# Patient Record
Sex: MALE | Race: WHITE | NOT HISPANIC OR LATINO | Employment: OTHER | URBAN - METROPOLITAN AREA
[De-identification: names, ages, dates, MRNs, and addresses within clinical notes are randomized per-mention and may not be internally consistent; named-entity substitution may affect disease eponyms.]

---

## 2018-06-18 DIAGNOSIS — Z12.11 COLON CANCER SCREENING: ICD-10-CM

## 2018-06-29 ENCOUNTER — OFFICE VISIT (OUTPATIENT)
Dept: FAMILY MEDICINE | Facility: CLINIC | Age: 68
End: 2018-06-29
Payer: MEDICARE

## 2018-06-29 VITALS
HEART RATE: 80 BPM | BODY MASS INDEX: 34.81 KG/M2 | SYSTOLIC BLOOD PRESSURE: 110 MMHG | RESPIRATION RATE: 20 BRPM | HEIGHT: 72 IN | WEIGHT: 257 LBS | DIASTOLIC BLOOD PRESSURE: 80 MMHG

## 2018-06-29 DIAGNOSIS — Z12.11 SCREEN FOR COLON CANCER: ICD-10-CM

## 2018-06-29 DIAGNOSIS — E78.5 HYPERLIPIDEMIA, UNSPECIFIED HYPERLIPIDEMIA TYPE: ICD-10-CM

## 2018-06-29 DIAGNOSIS — R97.20 ELEVATED PSA: Primary | ICD-10-CM

## 2018-06-29 DIAGNOSIS — Z12.5 SCREENING FOR PROSTATE CANCER: Primary | ICD-10-CM

## 2018-06-29 DIAGNOSIS — Z28.39 IMMUNIZATION DEFICIENCY: ICD-10-CM

## 2018-06-29 DIAGNOSIS — E55.9 VITAMIN D INSUFFICIENCY: ICD-10-CM

## 2018-06-29 DIAGNOSIS — N47.1 PHIMOSIS: ICD-10-CM

## 2018-06-29 LAB
ALBUMIN SERPL BCP-MCNC: 3.6 G/DL
ALP SERPL-CCNC: 95 U/L
ALT SERPL W/O P-5'-P-CCNC: 13 U/L
ANION GAP SERPL CALC-SCNC: 7 MMOL/L
AST SERPL-CCNC: 16 U/L
BASOPHILS # BLD AUTO: 0.06 K/UL
BASOPHILS NFR BLD: 1.1 %
BILIRUB SERPL-MCNC: 0.5 MG/DL
BUN SERPL-MCNC: 15 MG/DL
CALCIUM SERPL-MCNC: 9.1 MG/DL
CHLORIDE SERPL-SCNC: 107 MMOL/L
CHOLEST SERPL-MCNC: 182 MG/DL
CHOLEST/HDLC SERPL: 5.2 {RATIO}
CO2 SERPL-SCNC: 24 MMOL/L
COMPLEXED PSA SERPL-MCNC: 6.6 NG/ML
CREAT SERPL-MCNC: 1 MG/DL
DIFFERENTIAL METHOD: ABNORMAL
EOSINOPHIL # BLD AUTO: 0.2 K/UL
EOSINOPHIL NFR BLD: 2.9 %
ERYTHROCYTE [DISTWIDTH] IN BLOOD BY AUTOMATED COUNT: 17.2 %
EST. GFR  (AFRICAN AMERICAN): >60 ML/MIN/1.73 M^2
EST. GFR  (NON AFRICAN AMERICAN): >60 ML/MIN/1.73 M^2
GLUCOSE SERPL-MCNC: 95 MG/DL
HCT VFR BLD AUTO: 38.7 %
HDLC SERPL-MCNC: 35 MG/DL
HDLC SERPL: 19.2 %
HGB BLD-MCNC: 11.5 G/DL
LDLC SERPL CALC-MCNC: 97.4 MG/DL
LYMPHOCYTES # BLD AUTO: 1.3 K/UL
LYMPHOCYTES NFR BLD: 22.9 %
MCH RBC QN AUTO: 22.7 PG
MCHC RBC AUTO-ENTMCNC: 29.7 G/DL
MCV RBC AUTO: 76 FL
MONOCYTES # BLD AUTO: 0.5 K/UL
MONOCYTES NFR BLD: 8.7 %
NEUTROPHILS # BLD AUTO: 3.6 K/UL
NEUTROPHILS NFR BLD: 64.4 %
NONHDLC SERPL-MCNC: 147 MG/DL
PLATELET # BLD AUTO: 352 K/UL
PMV BLD AUTO: 10.3 FL
POTASSIUM SERPL-SCNC: 4.4 MMOL/L
PROT SERPL-MCNC: 7 G/DL
RBC # BLD AUTO: 5.07 M/UL
SODIUM SERPL-SCNC: 138 MMOL/L
TRIGL SERPL-MCNC: 248 MG/DL
TSH SERPL DL<=0.005 MIU/L-ACNC: 2.38 UIU/ML
WBC # BLD AUTO: 5.54 K/UL

## 2018-06-29 PROCEDURE — 80061 LIPID PANEL: CPT

## 2018-06-29 PROCEDURE — 99999 PR PBB SHADOW E&M-EST. PATIENT-LVL IV: CPT | Mod: PBBFAC,,, | Performed by: FAMILY MEDICINE

## 2018-06-29 PROCEDURE — 99214 OFFICE O/P EST MOD 30 MIN: CPT | Mod: PBBFAC | Performed by: FAMILY MEDICINE

## 2018-06-29 PROCEDURE — 36415 COLL VENOUS BLD VENIPUNCTURE: CPT | Mod: PBBFAC

## 2018-06-29 PROCEDURE — 82306 VITAMIN D 25 HYDROXY: CPT

## 2018-06-29 PROCEDURE — 80053 COMPREHEN METABOLIC PANEL: CPT

## 2018-06-29 PROCEDURE — 99999 PR STA SHADOW: CPT | Mod: PBBFAC,,, | Performed by: FAMILY MEDICINE

## 2018-06-29 PROCEDURE — 84153 ASSAY OF PSA TOTAL: CPT

## 2018-06-29 PROCEDURE — 99999 PNEUMOCOCCAL POLYSACCHARIDE VACCINE 23-VALENT =>2YO SQ IM: CPT | Mod: PBBFAC,,,

## 2018-06-29 PROCEDURE — 84443 ASSAY THYROID STIM HORMONE: CPT

## 2018-06-29 PROCEDURE — G0009 ADMIN PNEUMOCOCCAL VACCINE: HCPCS | Mod: PBBFAC

## 2018-06-29 PROCEDURE — 99215 OFFICE O/P EST HI 40 MIN: CPT | Mod: S$PBB | Performed by: FAMILY MEDICINE

## 2018-06-29 PROCEDURE — 85025 COMPLETE CBC W/AUTO DIFF WBC: CPT

## 2018-06-29 RX ORDER — CLOTRIMAZOLE AND BETAMETHASONE DIPROPIONATE 10; .64 MG/G; MG/G
CREAM TOPICAL
Qty: 45 G | Refills: 3 | Status: SHIPPED | OUTPATIENT
Start: 2018-06-29 | End: 2018-11-07

## 2018-06-29 NOTE — PROGRESS NOTES
The following lab results were abnormal. The following recommendations were made:PSA is too hi, so I want you to be seen by a urologist for this issue; also a low fat diet A& yoou are mildly anemic, so a Colonoscopy is a good place to start

## 2018-06-29 NOTE — PROGRESS NOTES
Subjective:       Patient ID: Awilda Rosenthal is a 67 y.o. male.    Chief Complaint: Follow-up (annual f/u)    Pt is a 67 y.o. male who presents for check up for phimosis. Doing well on current meds. Denies any side effects. Prevention is up to date.      Review of Systems   Constitutional: Negative for appetite change.   HENT: Negative for congestion, ear pain, sneezing and sore throat.    Eyes: Negative for redness and visual disturbance.   Respiratory: Negative for cough, chest tightness and stridor.    Cardiovascular: Negative for chest pain.   Gastrointestinal: Negative for abdominal pain, blood in stool, diarrhea, nausea and vomiting.   Genitourinary: Negative for difficulty urinating, dysuria and hematuria.        Foreskin is irritated   Musculoskeletal: Negative for arthralgias, back pain, joint swelling, myalgias and neck pain.   Skin: Negative for rash.   Neurological: Negative for dizziness.   Psychiatric/Behavioral: Negative for agitation. The patient is not nervous/anxious.        Objective:      Physical Exam   Constitutional: He is oriented to person, place, and time. He appears well-developed and well-nourished.   HENT:   Head: Normocephalic.   Eyes: Pupils are equal, round, and reactive to light.   Neck: Normal range of motion. Neck supple. No thyromegaly present.   Cardiovascular: Normal rate and regular rhythm.  Exam reveals no friction rub.    No murmur heard.  Pulmonary/Chest: Effort normal. No respiratory distress. He has no wheezes.   Abdominal: There is no tenderness. There is no rebound and no guarding.   Genitourinary:   Genitourinary Comments: 25 gm smooth; glans is dry, red and cracked   Musculoskeletal: Normal range of motion. He exhibits no edema or tenderness.   Lymphadenopathy:     He has no cervical adenopathy.   Neurological: He is alert and oriented to person, place, and time. He has normal reflexes. No cranial nerve deficit.   Skin: Skin is warm and dry.   Psychiatric: He  has a normal mood and affect. Judgment and thought content normal.       Assessment:       1. Screening for prostate cancer    2. Vitamin D insufficiency    3. Hyperlipidemia, unspecified hyperlipidemia type    4. Immunization deficiency    5. Phimosis    6. Screen for colon cancer        Plan:   Awilda was seen today for follow-up.    Diagnoses and all orders for this visit:    Screening for prostate cancer  -     PSA, Screening; Future    Vitamin D insufficiency  -     Vitamin D; Future    Hyperlipidemia, unspecified hyperlipidemia type  -     CBC auto differential; Future  -     Comprehensive metabolic panel; Future  -     TSH; Future  -     Lipid panel; Future    Immunization deficiency  -     Pneumococcal Polysaccharide Vaccine (23 Valent) (SQ/IM)    Phimosis    Screen for colon cancer  -     Ambulatory referral to Colorectal Surgery

## 2018-06-30 LAB — 25(OH)D3+25(OH)D2 SERPL-MCNC: 20 NG/ML

## 2018-07-02 ENCOUNTER — TELEPHONE (OUTPATIENT)
Dept: FAMILY MEDICINE | Facility: CLINIC | Age: 68
End: 2018-07-02

## 2018-07-02 NOTE — TELEPHONE ENCOUNTER
----- Message from Dustin Pete MD sent at 6/29/2018  4:47 PM CDT -----  The following lab results were abnormal. The following recommendations were made:PSA is too hi, so I want you to be seen by a urologist for this issue; also a low fat diet A& yoou are mildly anemic, so a Colonoscopy is a good place to start/also vit d otc 3,000 units daily

## 2018-07-03 ENCOUNTER — TELEPHONE (OUTPATIENT)
Dept: FAMILY MEDICINE | Facility: CLINIC | Age: 68
End: 2018-07-03

## 2018-07-03 NOTE — TELEPHONE ENCOUNTER
Our attempts to reach you by telephone have been unsuccessful. Please call our office at  [phone] to review your most recent lab and/or test results.

## 2018-07-27 ENCOUNTER — OFFICE VISIT (OUTPATIENT)
Dept: FAMILY MEDICINE | Facility: CLINIC | Age: 68
End: 2018-07-27
Payer: MEDICARE

## 2018-07-27 VITALS
DIASTOLIC BLOOD PRESSURE: 80 MMHG | SYSTOLIC BLOOD PRESSURE: 118 MMHG | WEIGHT: 257 LBS | RESPIRATION RATE: 20 BRPM | BODY MASS INDEX: 34.81 KG/M2 | HEART RATE: 80 BPM | HEIGHT: 72 IN

## 2018-07-27 DIAGNOSIS — R97.20 ELEVATED PSA: ICD-10-CM

## 2018-07-27 DIAGNOSIS — E78.5 HYPERLIPIDEMIA, UNSPECIFIED HYPERLIPIDEMIA TYPE: Primary | ICD-10-CM

## 2018-07-27 PROCEDURE — 99213 OFFICE O/P EST LOW 20 MIN: CPT | Mod: PBBFAC | Performed by: FAMILY MEDICINE

## 2018-07-27 PROCEDURE — 99999 PR STA SHADOW: CPT | Mod: PBBFAC,,, | Performed by: FAMILY MEDICINE

## 2018-07-27 PROCEDURE — 99213 OFFICE O/P EST LOW 20 MIN: CPT | Mod: S$PBB | Performed by: FAMILY MEDICINE

## 2018-07-27 PROCEDURE — 99999 PR PBB SHADOW E&M-EST. PATIENT-LVL III: CPT | Mod: PBBFAC,,, | Performed by: FAMILY MEDICINE

## 2018-07-27 RX ORDER — CHOLECALCIFEROL (VITAMIN D3) 25 MCG
2000 TABLET ORAL DAILY
COMMUNITY
End: 2020-10-14 | Stop reason: CLARIF

## 2018-07-27 NOTE — PROGRESS NOTES
Subjective:       Patient ID: Awilda Rosenthal is a 67 y.o. male.    Chief Complaint: Follow-up (1 mo)    Pt is a 67 y.o. male who presents for check up for elevated PSA . Doing well on current meds. Denies any side effects. Prevention is up to date.      Review of Systems   Constitutional: Negative for appetite change.   HENT: Negative for congestion, ear pain, sneezing and sore throat.    Eyes: Negative for redness and visual disturbance.   Respiratory: Negative for cough, chest tightness and stridor.    Cardiovascular: Negative for chest pain.   Gastrointestinal: Negative for abdominal pain, blood in stool, diarrhea, nausea and vomiting.   Genitourinary: Negative for difficulty urinating, dysuria and hematuria.         Nocturia x 2-3   Musculoskeletal: Negative for arthralgias, back pain, joint swelling, myalgias and neck pain.   Skin: Negative for rash.   Neurological: Negative for dizziness.        Sleeping 5-6 hrs   Psychiatric/Behavioral: Negative for agitation. The patient is not nervous/anxious.        Objective:      Physical Exam   Constitutional: He is oriented to person, place, and time. He appears well-developed.   BM 34   HENT:   Head: Normocephalic.   Eyes: Pupils are equal, round, and reactive to light.   Neck: Normal range of motion. Neck supple. No thyromegaly present.   Cardiovascular: Normal rate and regular rhythm.  Exam reveals no friction rub.    No murmur heard.  Pulmonary/Chest: Effort normal. No respiratory distress. He has no wheezes.   Abdominal: There is no tenderness. There is no rebound and no guarding.   Genitourinary:   Genitourinary Comments: Foreskin  Is healed   Musculoskeletal: Normal range of motion. He exhibits no edema or tenderness.   Lymphadenopathy:     He has no cervical adenopathy.   Neurological: He is alert and oriented to person, place, and time. He has normal reflexes. No cranial nerve deficit.   Skin: Skin is warm and dry.   Psychiatric: He has a normal mood  and affect. Judgment and thought content normal.       Assessment:       1. Hyperlipidemia, unspecified hyperlipidemia type    2. Elevated PSA        Plan:   Awilda was seen today for follow-up.    Diagnoses and all orders for this visit:    Hyperlipidemia, unspecified hyperlipidemia type    Elevated PSA

## 2018-08-10 DIAGNOSIS — Z11.59 NEED FOR HEPATITIS C SCREENING TEST: ICD-10-CM

## 2018-08-16 ENCOUNTER — OFFICE VISIT (OUTPATIENT)
Dept: SURGERY | Facility: CLINIC | Age: 68
End: 2018-08-16
Payer: MEDICARE

## 2018-08-16 VITALS
DIASTOLIC BLOOD PRESSURE: 74 MMHG | WEIGHT: 258.63 LBS | BODY MASS INDEX: 35.03 KG/M2 | SYSTOLIC BLOOD PRESSURE: 120 MMHG | HEIGHT: 72 IN

## 2018-08-16 DIAGNOSIS — Z12.11 COLON CANCER SCREENING: Primary | ICD-10-CM

## 2018-08-16 PROCEDURE — 99202 OFFICE O/P NEW SF 15 MIN: CPT | Mod: S$PBB,,, | Performed by: COLON & RECTAL SURGERY

## 2018-08-16 PROCEDURE — 99212 OFFICE O/P EST SF 10 MIN: CPT | Mod: PBBFAC | Performed by: COLON & RECTAL SURGERY

## 2018-08-16 PROCEDURE — 99999 PR PBB SHADOW E&M-EST. PATIENT-LVL II: CPT | Mod: PBBFAC,,, | Performed by: COLON & RECTAL SURGERY

## 2018-08-16 NOTE — LETTER
August 16, 2018      Dustin Pete MD  [address] [place] [address]           [facility]  [address] [place] [address]  Phone: [phone]  Fax: [phone]          Patient: Awilda Rosenthal   MR Number: [id number]   YOB: 1950   Date of Visit: 8/16/2018       Dear Dr. Dustin Pete:    Thank you for referring Awilda Rosenthal to me for evaluation. Attached you will find relevant portions of my assessment and plan of care.    If you have questions, please do not hesitate to call me. I look forward to following Awilda Rosenthal along with you.    Sincerely,    Rylan Razo MD    Enclosure  CC:  No Recipients    If you would like to receive this communication electronically, please contact [email or web address] or [phone] to request more information on [employer] Link access.    For providers and/or their staff who would like to refer a patient to [facility], please contact us through our one-stop-shop provider referral line, [facility], at [phone].    If you feel you have received this communication in error or would no longer like to receive these types of communications, please e-mail [email or web address]

## 2018-08-16 NOTE — PROGRESS NOTES
Subjective:       Patient ID: Awilda Rosenthal is a 67 y.o. male.    Chief Complaint: Consult    HPI   68 yo M referred for screening colonoscopy. +Recent bump in PSA.  +urinary frequency & weak stream.  Denies blood in stool.  No abdominal pain, unexplained weight loss, anorexia, recent change in bowel habits, or other constitutional symptoms. Has appt with  next week.    Last colonoscopy - 15-20 yrs ago   No known family hx of CRC or IBD.    Review of patient's allergies indicates:  No Known Allergies    History reviewed. No pertinent past medical history.    Past Surgical History:   Procedure Laterality Date    APPENDECTOMY      kidney stones         Current Outpatient Medications   Medication Sig Dispense Refill    aspirin (ECOTRIN) 81 MG EC tablet Take 81 mg by mouth once daily.      clotrimazole-betamethasone 1-0.05% (LOTRISONE) cream Apply to head of  Penis BId 45 g 3    vitamin D 1000 units Tab Take 2,000 Units by mouth once daily.       No current facility-administered medications for this visit.        Family History   Problem Relation Age of Onset    Heart disease Father        Social History     Socioeconomic History    Marital status: Single     Spouse name: None    Number of children: None    Years of education: None    Highest education level: None   Social Needs    Financial resource strain: None    Food insecurity - worry: None    Food insecurity - inability: None    Transportation needs - medical: None    Transportation needs - non-medical: None   Occupational History     Employer: OTHER   Tobacco Use    Smoking status: Never Smoker   Substance and Sexual Activity    Alcohol use: No    Drug use: No    Sexual activity: None   Other Topics Concern    None   Social History Narrative    None       Review of Systems   Constitutional: Negative for chills and fever.   HENT: Negative for congestion and sinus pressure.    Eyes: Negative for visual disturbance.   Respiratory:  Negative for cough and shortness of breath.    Cardiovascular: Negative for chest pain and palpitations.   Gastrointestinal: Negative for abdominal distention, abdominal pain, anal bleeding, blood in stool, constipation, diarrhea, nausea, rectal pain and vomiting.   Endocrine: Negative for cold intolerance and heat intolerance.   Genitourinary: Positive for frequency. Negative for dysuria.   Musculoskeletal: Negative for arthralgias and back pain.   Skin: Negative for rash.   Allergic/Immunologic: Negative for immunocompromised state.   Neurological: Negative for dizziness, light-headedness and headaches.   Psychiatric/Behavioral: Negative for confusion. The patient is not nervous/anxious.        Objective:      Physical Exam   Constitutional: He is oriented to person, place, and time. He appears well-developed and well-nourished.   HENT:   Head: Normocephalic and atraumatic.   Eyes: Conjunctivae are normal.   Pulmonary/Chest: Effort normal. No respiratory distress.   Abdominal: Soft. He exhibits no distension and no mass. There is no tenderness. There is no rebound and no guarding.   Neurological: He is alert and oriented to person, place, and time.   Skin: Skin is warm and dry. No erythema.         Lab Results   Component Value Date    WBC 5.54 06/29/2018    HGB 11.5 (L) 06/29/2018    HCT 38.7 (L) 06/29/2018    MCV 76 (L) 06/29/2018     (H) 06/29/2018     BMP  Lab Results   Component Value Date     06/29/2018    K 4.4 06/29/2018     06/29/2018    CO2 24 06/29/2018    BUN 15 06/29/2018    CREATININE 1.0 06/29/2018    CALCIUM 9.1 06/29/2018    ANIONGAP 7 (L) 06/29/2018    ESTGFRAFRICA >60 06/29/2018    EGFRNONAA >60 06/29/2018     CMP  Sodium   Date Value Ref Range Status   06/29/2018 138 136 - 145 mmol/L Final     Potassium   Date Value Ref Range Status   06/29/2018 4.4 3.5 - 5.1 mmol/L Final     Chloride   Date Value Ref Range Status   06/29/2018 107 95 - 110 mmol/L Final     CO2   Date Value  Ref Range Status   06/29/2018 24 23 - 29 mmol/L Final     Glucose   Date Value Ref Range Status   06/29/2018 95 70 - 110 mg/dL Final     BUN, Bld   Date Value Ref Range Status   06/29/2018 15 8 - 23 mg/dL Final     Creatinine   Date Value Ref Range Status   06/29/2018 1.0 0.5 - 1.4 mg/dL Final     Calcium   Date Value Ref Range Status   06/29/2018 9.1 8.7 - 10.5 mg/dL Final     Total Protein   Date Value Ref Range Status   06/29/2018 7.0 6.0 - 8.4 g/dL Final     Albumin   Date Value Ref Range Status   06/29/2018 3.6 3.5 - 5.2 g/dL Final     Total Bilirubin   Date Value Ref Range Status   06/29/2018 0.5 0.1 - 1.0 mg/dL Final     Comment:     For infants and newborns, interpretation of results should be based  on gestational age, weight and in agreement with clinical  observations.  Premature Infant recommended reference ranges:  Up to 24 hours.............<8.0 mg/dL  Up to 48 hours............<12.0 mg/dL  3-5 days..................<15.0 mg/dL  6-29 days.................<15.0 mg/dL       Alkaline Phosphatase   Date Value Ref Range Status   06/29/2018 95 55 - 135 U/L Final     AST   Date Value Ref Range Status   06/29/2018 16 10 - 40 U/L Final     ALT   Date Value Ref Range Status   06/29/2018 13 10 - 44 U/L Final     Anion Gap   Date Value Ref Range Status   06/29/2018 7 (L) 8 - 16 mmol/L Final     eGFR if    Date Value Ref Range Status   06/29/2018 >60 >60 mL/min/1.73 m^2 Final     eGFR if non    Date Value Ref Range Status   06/29/2018 >60 >60 mL/min/1.73 m^2 Final     Comment:     Calculation used to obtain the estimated glomerular filtration  rate (eGFR) is the CKD-EPI equation.        No results found for: CEA        Assessment:       1. Colon cancer screening        Plan:   Schedule for low risk screening colonoscopy at [facility].    Rylan Razo MD, FACS, FASCRS  Senior Staff Surgeon  Department of Colon & Rectal Surgery

## 2018-08-22 ENCOUNTER — OFFICE VISIT (OUTPATIENT)
Dept: UROLOGY | Facility: CLINIC | Age: 68
End: 2018-08-22
Attending: UROLOGY
Payer: MEDICARE

## 2018-08-22 VITALS
BODY MASS INDEX: 34.72 KG/M2 | HEART RATE: 80 BPM | WEIGHT: 256.38 LBS | RESPIRATION RATE: 20 BRPM | SYSTOLIC BLOOD PRESSURE: 134 MMHG | HEIGHT: 72 IN | DIASTOLIC BLOOD PRESSURE: 82 MMHG

## 2018-08-22 DIAGNOSIS — R97.20 ELEVATED PSA: Primary | ICD-10-CM

## 2018-08-22 PROCEDURE — 99999 PR PBB SHADOW E&M-EST. PATIENT-LVL III: CPT | Mod: PBBFAC,,, | Performed by: UROLOGY

## 2018-08-22 PROCEDURE — 99204 OFFICE O/P NEW MOD 45 MIN: CPT | Mod: S$PBB | Performed by: UROLOGY

## 2018-08-22 PROCEDURE — 99213 OFFICE O/P EST LOW 20 MIN: CPT | Mod: PBBFAC | Performed by: UROLOGY

## 2018-08-22 PROCEDURE — 99999 PR STA SHADOW: CPT | Mod: PBBFAC,,, | Performed by: UROLOGY

## 2018-08-22 RX ORDER — SULFAMETHOXAZOLE AND TRIMETHOPRIM 800; 160 MG/1; MG/1
1 TABLET ORAL 2 TIMES DAILY
Qty: 60 TABLET | Refills: 0 | Status: SHIPPED | OUTPATIENT
Start: 2018-08-22 | End: 2018-09-21

## 2018-08-22 NOTE — LETTER
August 22, 2018      Dustin Pete MD  [address]  [place] [address]           [place] [facility]  [address] [facility] [address]  Phone: [phone]          Patient: Awilda Rosenthal   MR Number: [id number]   YOB: 1950   Date of Visit: 8/22/2018       Dear Dr. Dustin Pete:    Thank you for referring Awilda Rosenthal to me for evaluation. Attached you will find relevant portions of my assessment and plan of care.    If you have questions, please do not hesitate to call me. I look forward to following Awilda Rosenthal along with you.    Sincerely,    Bashir Giles Jr., MD    Enclosure  CC:  No Recipients    If you would like to receive this communication electronically, please contact [email or web address] or [phone] to request more information on [employer] Link access.    For providers and/or their staff who would like to refer a patient to [facility], please contact us through our one-stop-shop provider referral line, [facility] , at [phone].    If you feel you have received this communication in error or would no longer like to receive these types of communications, please e-mail [email or web address]

## 2018-08-22 NOTE — PROGRESS NOTES
Subjective:       Patient ID: Awilda Rosenthal is a 67 y.o. male.    Chief Complaint: Urinary Frequency and Urinary Urgency    HPI patient is here with an elevated PSA of 6.5.  He has urgency and frequency but he has a good stream and empties his bladder well.  Urine is negative.    History reviewed. No pertinent past medical history.    Past Surgical History:   Procedure Laterality Date    APPENDECTOMY      kidney stones         Family History   Problem Relation Age of Onset    Heart disease Father        Social History     Socioeconomic History    Marital status: Single     Spouse name: Not on file    Number of children: Not on file    Years of education: Not on file    Highest education level: Not on file   Social Needs    Financial resource strain: Not on file    Food insecurity - worry: Not on file    Food insecurity - inability: Not on file    Transportation needs - medical: Not on file    Transportation needs - non-medical: Not on file   Occupational History     Employer: OTHER   Tobacco Use    Smoking status: Never Smoker   Substance and Sexual Activity    Alcohol use: No    Drug use: No    Sexual activity: Not on file   Other Topics Concern    Not on file   Social History Narrative    Not on file       Allergies:  Patient has no known allergies.    Medications:    Current Outpatient Medications:     aspirin (ECOTRIN) 81 MG EC tablet, Take 81 mg by mouth once daily., Disp: , Rfl:     clotrimazole-betamethasone 1-0.05% (LOTRISONE) cream, Apply to head of  Penis BId, Disp: 45 g, Rfl: 3    vitamin D 1000 units Tab, Take 2,000 Units by mouth once daily., Disp: , Rfl:     sulfamethoxazole-trimethoprim 800-160mg (BACTRIM DS) 800-160 mg Tab, Take 1 tablet by mouth 2 (two) times daily., Disp: 60 tablet, Rfl: 0    Review of Systems   Constitutional: Negative for activity change, appetite change, chills, diaphoresis, fatigue, fever and unexpected weight change.   HENT: Negative for congestion,  dental problem, hearing loss, mouth sores, postnasal drip, rhinorrhea, sinus pressure and trouble swallowing.    Eyes: Negative for pain, discharge and itching.   Respiratory: Negative for apnea, cough, choking, chest tightness, shortness of breath and wheezing.    Cardiovascular: Negative for chest pain, palpitations and leg swelling.   Gastrointestinal: Negative for abdominal distention, abdominal pain, anal bleeding, blood in stool, constipation, diarrhea, nausea, rectal pain and vomiting.   Endocrine: Negative for polydipsia and polyuria.   Genitourinary: Positive for urgency. Negative for decreased urine volume, difficulty urinating, discharge, dysuria, enuresis, flank pain, frequency, genital sores, hematuria, penile pain, penile swelling, scrotal swelling and testicular pain.   Musculoskeletal: Negative for arthralgias, back pain and myalgias.   Skin: Negative for color change, rash and wound.   Neurological: Negative for dizziness, syncope, speech difficulty, light-headedness and headaches.   Hematological: Negative for adenopathy. Does not bruise/bleed easily.   Psychiatric/Behavioral: Negative for behavioral problems, confusion, hallucinations and sleep disturbance.       Objective:      Physical Exam   Constitutional: He appears well-developed.   HENT:   Head: Normocephalic.   Cardiovascular: Normal rate.    Pulmonary/Chest: Effort normal.   Abdominal: Soft.   Genitourinary: Prostate normal.   Genitourinary Comments: 30 g benign no nodules   Neurological: He is alert.   Skin: Skin is warm.     Psychiatric: He has a normal mood and affect.       Assessment:       1. Elevated PSA        Plan:       Awilda was seen today for urinary frequency and urinary urgency.    Diagnoses and all orders for this visit:    Elevated PSA  -     Prostate Specific Antigen, Diagnostic; Future    Other orders  -     sulfamethoxazole-trimethoprim 800-160mg (BACTRIM DS) 800-160 mg Tab; Take 1 tablet by mouth 2 (two) times  daily.        return to clinic 6 weeks with PSA

## 2018-09-28 ENCOUNTER — LAB VISIT (OUTPATIENT)
Dept: LAB | Facility: HOSPITAL | Age: 68
End: 2018-09-28
Attending: UROLOGY
Payer: MEDICARE

## 2018-09-28 DIAGNOSIS — R97.20 ELEVATED PSA: ICD-10-CM

## 2018-09-28 LAB — COMPLEXED PSA SERPL-MCNC: 5.7 NG/ML

## 2018-09-28 PROCEDURE — 84153 ASSAY OF PSA TOTAL: CPT

## 2018-09-28 PROCEDURE — 36415 COLL VENOUS BLD VENIPUNCTURE: CPT

## 2018-10-03 ENCOUNTER — OFFICE VISIT (OUTPATIENT)
Dept: UROLOGY | Facility: CLINIC | Age: 68
End: 2018-10-03
Attending: UROLOGY
Payer: MEDICARE

## 2018-10-03 VITALS
DIASTOLIC BLOOD PRESSURE: 82 MMHG | RESPIRATION RATE: 18 BRPM | WEIGHT: 258.63 LBS | BODY MASS INDEX: 35.03 KG/M2 | HEIGHT: 72 IN | SYSTOLIC BLOOD PRESSURE: 128 MMHG | HEART RATE: 88 BPM

## 2018-10-03 DIAGNOSIS — R97.20 ELEVATED PSA: Primary | ICD-10-CM

## 2018-10-03 PROCEDURE — 99999 PR STA SHADOW: CPT | Mod: PBBFAC,,, | Performed by: UROLOGY

## 2018-10-03 PROCEDURE — 99214 OFFICE O/P EST MOD 30 MIN: CPT | Mod: S$PBB | Performed by: UROLOGY

## 2018-10-03 PROCEDURE — 99999 PR PBB SHADOW E&M-EST. PATIENT-LVL III: CPT | Mod: PBBFAC,,, | Performed by: UROLOGY

## 2018-10-03 PROCEDURE — 99213 OFFICE O/P EST LOW 20 MIN: CPT | Mod: PBBFAC | Performed by: UROLOGY

## 2018-10-03 RX ORDER — CIPROFLOXACIN 500 MG/1
500 TABLET ORAL EVERY 12 HOURS
Qty: 4 TABLET | Refills: 0 | Status: SHIPPED | OUTPATIENT
Start: 2018-10-03 | End: 2018-10-05

## 2018-10-03 NOTE — PROGRESS NOTES
Subjective:       Patient ID: Awilda Rosenthal is a 67 y.o. male.    Chief Complaint: Follow-up (6 weeks)    HPI patient had an elevated PSA was treated with Bactrim.  His repeat PSA came down 5.7 which is still elevated.  Patient needs a truss with biopsy.      History reviewed. No pertinent past medical history.    Past Surgical History:   Procedure Laterality Date    APPENDECTOMY      kidney stones         Family History   Problem Relation Age of Onset    Heart disease Father        Social History     Socioeconomic History    Marital status: Single     Spouse name: Not on file    Number of children: Not on file    Years of education: Not on file    Highest education level: Not on file   Social Needs    Financial resource strain: Not on file    Food insecurity - worry: Not on file    Food insecurity - inability: Not on file    Transportation needs - medical: Not on file    Transportation needs - non-medical: Not on file   Occupational History     Employer: OTHER   Tobacco Use    Smoking status: Never Smoker   Substance and Sexual Activity    Alcohol use: No    Drug use: No    Sexual activity: Not on file   Other Topics Concern    Not on file   Social History Narrative    Not on file       Allergies:  Patient has no known allergies.    Medications:    Current Outpatient Medications:     aspirin (ECOTRIN) 81 MG EC tablet, Take 81 mg by mouth once daily., Disp: , Rfl:     clotrimazole-betamethasone 1-0.05% (LOTRISONE) cream, Apply to head of  Penis BId, Disp: 45 g, Rfl: 3    vitamin D 1000 units Tab, Take 2,000 Units by mouth once daily., Disp: , Rfl:     ciprofloxacin HCl (CIPRO) 500 MG tablet, Take 1 tablet (500 mg total) by mouth every 12 (twelve) hours. Start night before prostate biopsy for 4 doses, Disp: 4 tablet, Rfl: 0    Review of Systems   Constitutional: Negative for activity change, appetite change, chills, diaphoresis, fatigue, fever and unexpected weight change.   HENT: Negative  for congestion, dental problem, hearing loss, mouth sores, postnasal drip, rhinorrhea, sinus pressure and trouble swallowing.    Eyes: Negative for pain, discharge and itching.   Respiratory: Negative for apnea, cough, choking, chest tightness, shortness of breath and wheezing.    Cardiovascular: Negative for chest pain, palpitations and leg swelling.   Gastrointestinal: Negative for abdominal distention, abdominal pain, anal bleeding, blood in stool, constipation, diarrhea, nausea, rectal pain and vomiting.   Endocrine: Negative for polydipsia and polyuria.   Genitourinary: Negative for decreased urine volume, difficulty urinating, discharge, dysuria, enuresis, flank pain, frequency, genital sores, hematuria, penile pain, penile swelling, scrotal swelling, testicular pain and urgency.   Musculoskeletal: Negative for arthralgias, back pain and myalgias.   Skin: Negative for color change, rash and wound.   Neurological: Negative for dizziness, syncope, speech difficulty, light-headedness and headaches.   Hematological: Negative for adenopathy. Does not bruise/bleed easily.   Psychiatric/Behavioral: Negative for behavioral problems, confusion, hallucinations and sleep disturbance.       Objective:      Physical Exam   Constitutional: He appears well-developed.   HENT:   Head: Normocephalic.   Cardiovascular: Normal rate.    Pulmonary/Chest: Effort normal.   Abdominal: Soft.   Genitourinary: Prostate normal.   Neurological: He is alert.   Skin: Skin is warm.     Psychiatric: He has a normal mood and affect.       Assessment:       1. Elevated PSA        Plan:       Awilda was seen today for follow-up.    Diagnoses and all orders for this visit:    Elevated PSA  -     Transrectal ultrasound w/ biopsy; Future    Other orders  -     ciprofloxacin HCl (CIPRO) 500 MG tablet; Take 1 tablet (500 mg total) by mouth every 12 (twelve) hours. Start night before prostate biopsy for 4 doses        will do truss with biopsy under IV  sedation

## 2018-10-11 ENCOUNTER — TELEPHONE (OUTPATIENT)
Dept: UROLOGY | Facility: CLINIC | Age: 68
End: 2018-10-11

## 2018-10-11 NOTE — TELEPHONE ENCOUNTER
----- Message from Mehreen Huff MA sent at 10/11/2018  2:04 PM CDT -----  Contact: self  [phone]  Patient Returning Call from [facility]    Who Left Message for Patient:  Yennifer  Communication Preference:  Phone# above  Additional Information: na

## 2018-10-11 NOTE — TELEPHONE ENCOUNTER
Pt has written instructions for trus c bx. Went over instructions for blood thinners, enema, antibioitcs and npo after midnight.

## 2018-10-17 ENCOUNTER — HOSPITAL ENCOUNTER (OUTPATIENT)
Facility: HOSPITAL | Age: 68
Discharge: HOME | End: 2018-10-17
Attending: UROLOGY | Admitting: UROLOGY
Payer: MEDICARE

## 2018-10-17 VITALS
SYSTOLIC BLOOD PRESSURE: 124 MMHG | TEMPERATURE: 96 F | DIASTOLIC BLOOD PRESSURE: 58 MMHG | RESPIRATION RATE: 18 BRPM | OXYGEN SATURATION: 94 % | HEART RATE: 72 BPM

## 2018-10-17 DIAGNOSIS — R97.20 ELEVATED PSA: Primary | ICD-10-CM

## 2018-10-17 PROCEDURE — 76942 ECHO GUIDE FOR BIOPSY: CPT | Mod: 26,59,, | Performed by: UROLOGY

## 2018-10-17 PROCEDURE — 63600175 PHARM REV CODE 636 W HCPCS: Performed by: UROLOGY

## 2018-10-17 PROCEDURE — 76872 US TRANSRECTAL: CPT | Mod: 26,,, | Performed by: UROLOGY

## 2018-10-17 PROCEDURE — 88305 TISSUE EXAM BY PATHOLOGIST: CPT | Mod: 59 | Performed by: PATHOLOGY

## 2018-10-17 PROCEDURE — 88342 IMHCHEM/IMCYTCHM 1ST ANTB: CPT | Performed by: PATHOLOGY

## 2018-10-17 PROCEDURE — 25000003 PHARM REV CODE 250: Performed by: UROLOGY

## 2018-10-17 PROCEDURE — 36000704 HC OR TIME LEV I 1ST 15 MIN: Performed by: UROLOGY

## 2018-10-17 PROCEDURE — 88305 TISSUE EXAM BY PATHOLOGIST: CPT | Mod: 26,,, | Performed by: PATHOLOGY

## 2018-10-17 PROCEDURE — 36000705 HC OR TIME LEV I EA ADD 15 MIN: Performed by: UROLOGY

## 2018-10-17 PROCEDURE — 55700 PR BIOPSY OF PROSTATE,NEEDLE/PUNCH: CPT | Mod: ,,, | Performed by: UROLOGY

## 2018-10-17 RX ORDER — LIDOCAINE HYDROCHLORIDE 20 MG/ML
JELLY TOPICAL ONCE
Status: DISCONTINUED | OUTPATIENT
Start: 2018-10-17 | End: 2019-10-16 | Stop reason: ALTCHOICE

## 2018-10-17 RX ORDER — FENTANYL CITRATE 50 UG/ML
INJECTION, SOLUTION INTRAMUSCULAR; INTRAVENOUS
Status: DISCONTINUED | OUTPATIENT
Start: 2018-10-17 | End: 2018-10-17 | Stop reason: HOSPADM

## 2018-10-17 RX ORDER — LIDOCAINE HYDROCHLORIDE 10 MG/ML
10 INJECTION INFILTRATION; PERINEURAL ONCE
Status: DISCONTINUED | OUTPATIENT
Start: 2018-10-17 | End: 2018-11-07

## 2018-10-17 RX ORDER — LIDOCAINE HYDROCHLORIDE 20 MG/ML
INJECTION, SOLUTION INFILTRATION; PERINEURAL
Status: DISCONTINUED | OUTPATIENT
Start: 2018-10-17 | End: 2018-10-17 | Stop reason: HOSPADM

## 2018-10-17 RX ORDER — LIDOCAINE HYDROCHLORIDE 20 MG/ML
JELLY TOPICAL ONCE
Status: DISCONTINUED | OUTPATIENT
Start: 2018-10-17 | End: 2018-11-07

## 2018-10-17 RX ORDER — MIDAZOLAM HYDROCHLORIDE 5 MG/ML
INJECTION INTRAMUSCULAR; INTRAVENOUS
Status: DISCONTINUED | OUTPATIENT
Start: 2018-10-17 | End: 2018-10-17 | Stop reason: HOSPADM

## 2018-10-17 NOTE — OP NOTE
DATE OF PROCEDURE:  10/17/2018    PREOPERATIVE DIAGNOSIS:  Elevated PSA.    POSTOPERATIVE DIAGNOSIS:  Elevated PSA.    OPERATION PERFORMED:  Transrectal prostate ultrasound-guided needle biopsy.    ANESTHESIA:  RN IV sedation.    SURGEON:  Bashir Giles M.D.    BIOPSIES TAKEN:  12.    BLOOD LOSS:  Minimal.    CLINICAL SUMMARY:  This gentleman had an elevated PSA, it was 5.7.  It was felt   that a TRUS with biopsy was indicated.    PROCEDURE IN DETAIL:  The patient was taken to the Cystoscopy Suite and was   placed on the table in left lateral decubitus position.  He was given   intravenous sedation per RN.  Transrectal prostate ultrasonography was performed   in the usual fashion.  Sagittal and transverse views of the prostate were made.    No hypo or hyperechoic lesions were noted.  A few small stones were seen.    Prostate measured 5.8 x5.0 x 5.2 cm for a total volume of 81 g.  Random sextant   biopsies were performed.  A total of 12 biopsies were made.  The patient   tolerated the procedure well.  He will finish his antibiotics that he started   last night and this morning.  He will be called with the results of the biopsy   once available and he will call if he has fever over 101.  If negative, the   patient will return in six months with a PSA.      PATRICK/ENRIQUETA  dd: 10/17/2018 09:10:58 (CDT)  td: 10/17/2018 12:44:06 (CDT)  Doc ID   #[id number]  Job ID #[id number]    CC:

## 2018-10-17 NOTE — INTERVAL H&P NOTE
The patient has been examined and the H&P has been reviewed:      I concur with the findings and no changes have occurred since H&P was written.        Patient cleared for Anesthesia: Conscious Sedation        Anesthesia/Surgery risks, benefits and alternative options discussed and understood by patient/family.      There are no hospital problems to display for this patient.

## 2018-10-17 NOTE — DISCHARGE INSTRUCTIONS
1. Following your procedure, you will be taken by wheelchair to your car by an attendant.  2. Do not drive, operate machinery or drink alcoholic beverage for twelve (12) hours.  3. Do not smoke immediately after your procedure. It is recommended that you refrain from smoking for twelve (12) hours.  4. You may not remember your results of your tests as discussed by your physician. You need to make a follow-up appointment as discussed.  5. After Cystoscopy, you can anticipate burning on urination, frequency, and even passage of some blood. A angelica intake of fluids will decrease these symptoms.  6. Increased fall risk due to sedation.  7. Circumstances may necessitate your physician admitting you to the hospital. Please be prepared for this if the doctor decides this is necessary.  8. If you must contact your physician and are unable to reach him, CALL THE [facility] EMERGENCY ROOM for assistance at [phone]. Explain your problem and you will be referred to a physician.

## 2018-10-24 RX ORDER — BISACODYL 5 MG
20 TABLET, DELAYED RELEASE (ENTERIC COATED) ORAL ONCE
Qty: 4 TABLET | Refills: 0 | Status: SHIPPED | OUTPATIENT
Start: 2018-10-24 | End: 2018-10-26

## 2018-10-24 RX ORDER — SODIUM, POTASSIUM,MAG SULFATES 17.5-3.13G
2 SOLUTION, RECONSTITUTED, ORAL ORAL DAILY
Qty: 354 ML | Refills: 0 | Status: SHIPPED | OUTPATIENT
Start: 2018-10-24 | End: 2018-10-26

## 2018-10-25 ENCOUNTER — HOSPITAL ENCOUNTER (OUTPATIENT)
Dept: PREADMISSION TESTING | Facility: HOSPITAL | Age: 68
Discharge: HOME | End: 2018-10-25
Attending: FAMILY MEDICINE
Payer: MEDICARE

## 2018-10-25 ENCOUNTER — ANESTHESIA EVENT (OUTPATIENT)
Dept: ENDOSCOPY | Facility: HOSPITAL | Age: 68
End: 2018-10-25
Payer: MEDICARE

## 2018-10-25 VITALS — WEIGHT: 257.06 LBS | BODY MASS INDEX: 34.82 KG/M2 | HEIGHT: 72 IN

## 2018-10-25 DIAGNOSIS — Z12.11 COLON CANCER SCREENING: Primary | ICD-10-CM

## 2018-10-25 NOTE — DISCHARGE INSTRUCTIONS
Follow instructions as written on Dr. Razo's Colonoscopy Prep Sheet      Colonoscopy Outpatient procedure instructions    Prep Review  Nothing to eat or drink after midnight unless your doctor tells you differently. Dr. Razo patients have nothing to eat or drink after morning prep is complete      Take medications as instructed by your doctor.    Wear something comfortable that is easy for you to take off and put on.   Do not wear any makeup, jewelry, or body piercings. Leave valuables at home or let your family member keep them for you. Do not bring them to the Surgery area.     Date/Day of Procedure: Thursday 11-1-18    Arrival time: Someone will call you the workday day before the procedure           between 1pm and 5pm to give you an arrival time   If asked to report to the hospital before 7:00 am report to the Emergency Room.  If asked to report to the hospital at 7:00 a.m. or later report to Patient Registration  It is not necessary to report earlier than the time you are told.   Ignore any automated/computer generated calls telling to what time to report to the hospital.   Plan to be at the hospital for about 4 hours, however, it could be longer.

## 2018-11-01 ENCOUNTER — ANESTHESIA (OUTPATIENT)
Dept: ENDOSCOPY | Facility: HOSPITAL | Age: 68
End: 2018-11-01
Payer: MEDICARE

## 2018-11-01 ENCOUNTER — HOSPITAL ENCOUNTER (OUTPATIENT)
Facility: HOSPITAL | Age: 68
Discharge: HOME | End: 2018-11-01
Attending: COLON & RECTAL SURGERY | Admitting: COLON & RECTAL SURGERY
Payer: MEDICARE

## 2018-11-01 VITALS
HEIGHT: 72 IN | TEMPERATURE: 97 F | OXYGEN SATURATION: 94 % | RESPIRATION RATE: 18 BRPM | HEART RATE: 73 BPM | SYSTOLIC BLOOD PRESSURE: 109 MMHG | DIASTOLIC BLOOD PRESSURE: 58 MMHG | BODY MASS INDEX: 32.51 KG/M2 | WEIGHT: 240 LBS

## 2018-11-01 DIAGNOSIS — Z12.11 SCREENING FOR COLON CANCER: ICD-10-CM

## 2018-11-01 PROCEDURE — 25000003 PHARM REV CODE 250: Performed by: COLON & RECTAL SURGERY

## 2018-11-01 PROCEDURE — 27201012 HC FORCEPS, HOT/COLD, DISP: Performed by: COLON & RECTAL SURGERY

## 2018-11-01 PROCEDURE — 45380 COLONOSCOPY AND BIOPSY: CPT | Performed by: COLON & RECTAL SURGERY

## 2018-11-01 PROCEDURE — 88305 TISSUE EXAM BY PATHOLOGIST: CPT | Performed by: PATHOLOGY

## 2018-11-01 PROCEDURE — 27201089 HC SNARE, DISP (ANY): Performed by: COLON & RECTAL SURGERY

## 2018-11-01 PROCEDURE — 63600175 PHARM REV CODE 636 W HCPCS: Performed by: NURSE ANESTHETIST, CERTIFIED REGISTERED

## 2018-11-01 PROCEDURE — 00811 ANES LWR INTST NDSC NOS: CPT | Mod: QZ,P2,PT | Performed by: NURSE ANESTHETIST, CERTIFIED REGISTERED

## 2018-11-01 PROCEDURE — 45385 COLONOSCOPY W/LESION REMOVAL: CPT | Mod: PT,,, | Performed by: COLON & RECTAL SURGERY

## 2018-11-01 PROCEDURE — 37000008 HC ANESTHESIA 1ST 15 MINUTES: Performed by: COLON & RECTAL SURGERY

## 2018-11-01 PROCEDURE — 88305 TISSUE EXAM BY PATHOLOGIST: CPT | Mod: 26,,, | Performed by: PATHOLOGY

## 2018-11-01 PROCEDURE — 45385 COLONOSCOPY W/LESION REMOVAL: CPT | Performed by: COLON & RECTAL SURGERY

## 2018-11-01 PROCEDURE — 45380 COLONOSCOPY AND BIOPSY: CPT | Mod: 59,,, | Performed by: COLON & RECTAL SURGERY

## 2018-11-01 PROCEDURE — 37000009 HC ANESTHESIA EA ADD 15 MINS: Performed by: COLON & RECTAL SURGERY

## 2018-11-01 RX ORDER — LIDOCAINE HCL/PF 100 MG/5ML
SYRINGE (ML) INTRAVENOUS
Status: DISCONTINUED | OUTPATIENT
Start: 2018-11-01 | End: 2018-11-01

## 2018-11-01 RX ORDER — SODIUM CHLORIDE, SODIUM LACTATE, POTASSIUM CHLORIDE, CALCIUM CHLORIDE 600; 310; 30; 20 MG/100ML; MG/100ML; MG/100ML; MG/100ML
INJECTION, SOLUTION INTRAVENOUS CONTINUOUS
Status: DISCONTINUED | OUTPATIENT
Start: 2018-11-01 | End: 2018-11-01 | Stop reason: HOSPADM

## 2018-11-01 RX ORDER — PROPOFOL 10 MG/ML
INJECTION, EMULSION INTRAVENOUS CONTINUOUS PRN
Status: DISCONTINUED | OUTPATIENT
Start: 2018-11-01 | End: 2018-11-01

## 2018-11-01 RX ORDER — PROPOFOL 10 MG/ML
INJECTION, EMULSION INTRAVENOUS
Status: DISCONTINUED | OUTPATIENT
Start: 2018-11-01 | End: 2018-11-01

## 2018-11-01 RX ADMIN — LIDOCAINE HYDROCHLORIDE 50 MG: 20 INJECTION, SOLUTION INTRAVENOUS at 08:11

## 2018-11-01 RX ADMIN — PROPOFOL 80 MG: 10 INJECTION, EMULSION INTRAVENOUS at 08:11

## 2018-11-01 RX ADMIN — PROPOFOL 20 MG: 10 INJECTION, EMULSION INTRAVENOUS at 09:11

## 2018-11-01 RX ADMIN — SODIUM CHLORIDE, SODIUM LACTATE, POTASSIUM CHLORIDE, AND CALCIUM CHLORIDE 500 ML: .6; .31; .03; .02 INJECTION, SOLUTION INTRAVENOUS at 07:11

## 2018-11-01 RX ADMIN — PROPOFOL 150 MCG/KG/MIN: 10 INJECTION, EMULSION INTRAVENOUS at 08:11

## 2018-11-01 RX ADMIN — SODIUM CHLORIDE, SODIUM LACTATE, POTASSIUM CHLORIDE, AND CALCIUM CHLORIDE: .6; .31; .03; .02 INJECTION, SOLUTION INTRAVENOUS at 09:11

## 2018-11-01 NOTE — H&P
Procedure : Colonoscopy    Indication(s):  asymptomatic screening exam    Review of patient's allergies indicates:  No Known Allergies    History reviewed. No pertinent past medical history.    Prior to Admission medications    Medication Sig Start Date End Date Taking? Authorizing Provider   aspirin (ECOTRIN) 81 MG EC tablet Take 81 mg by mouth once daily.   Yes Historical Provider, MD   clotrimazole-betamethasone 1-0.05% (LOTRISONE) cream Apply to head of  Penis BId 6/29/18  Yes Dustin Pete MD   OPW TEST CLAIM - DO NOT FILL OPW test claim. Do not fill. 10/24/18  Yes Rylan Razo MD   vitamin D 1000 units Tab Take 2,000 Units by mouth once daily.   Yes Historical Provider, MD       Sedation Problems: NO    Family History   Problem Relation Age of Onset    Heart disease Father        Fam Hx of Sedation Problems: NO    Social History     Socioeconomic History    Marital status: Single     Spouse name: Not on file    Number of children: Not on file    Years of education: Not on file    Highest education level: Not on file   Social Needs    Financial resource strain: Not on file    Food insecurity - worry: Not on file    Food insecurity - inability: Not on file    Transportation needs - medical: Not on file    Transportation needs - non-medical: Not on file   Occupational History     Employer: OTHER   Tobacco Use    Smoking status: Never Smoker    Smokeless tobacco: Never Used   Substance and Sexual Activity    Alcohol use: No    Drug use: No    Sexual activity: Not on file   Other Topics Concern    Not on file   Social History Narrative    Not on file       Review of Systems -     Respiratory ROS: no cough, shortness of breath, or wheezing  Cardiovascular ROS: no chest pain or dyspnea on exertion  Gastrointestinal ROS: no abdominal pain, change in bowel habits, or black or bloody stools  Musculoskeletal ROS: negative  Neurological ROS: no TIA or stroke symptoms        Physical  Exam:  General: no distress  Head: normocephalic  Airway:  normal oropharynx, airway normal  Neck: supple, symmetrical, trachea midline  Lungs:  normal respiratory effort  Heart: regular rate and rhythm  Abdomen: soft, non-tender non-distented; bowel sounds normal; no masses,  no organomegaly  Extremities: no cyanosis or edema, or clubbing       Deep Sedation: Mallampati Score per anesthesia     SedationPlan :Moderate     ASA : II    Patient is medically cleared for anesthesia.    Anesthesia/Surgery risks, benefits and alternative options discussed and understood by patient/family.

## 2018-11-01 NOTE — TRANSFER OF CARE
Anesthesia Transfer of Care Note    Patient: Awilda Rosenthal    Procedure(s) Performed: Procedure(s) (LRB):  COLONOSCOPY (N/A)    Patient location: PACU    Anesthesia Type: general    Transport from OR: Transported from OR on 6-10 L/min O2 by face mask with adequate spontaneous ventilation    Post pain: adequate analgesia    Post assessment: no apparent anesthetic complications and tolerated procedure well    Post vital signs: stable    Level of consciousness: sedated    Nausea/Vomiting: no nausea/vomiting    Complications: none    Transfer of care protocol was followed      Last vitals:   Visit Vitals  /74 (BP Location: Left arm, Patient Position: Lying)   Pulse 87   Temp 35.9 °C (96.6 °F)   Resp 18   Ht 6' (1.829 m)   Wt 108.9 kg (240 lb)   SpO2 95%   BMI 32.55 kg/m²

## 2018-11-01 NOTE — DISCHARGE SUMMARY
Discharge Note  Short Stay      SUMMARY     Admit Date: 11/1/2018    Attending Physician: Rylan Razo MD     Discharge Physician: Rylan Razo MD    Discharge Date: 11/1/2018 10:05 AM    Admission Diagnosis: asymptomatic screening exam    Final Diagnosis:  Colon polyps    Procedures Performed:    Colonoscopy polypectomy cold forceps and Colonoscopy polypectomy hot snare    Disposition: Home or Self Care    Condition at Discharge:  Stable    Patient Instructions:   Current Discharge Medication List      CONTINUE these medications which have NOT CHANGED    Details   aspirin (ECOTRIN) 81 MG EC tablet Take 81 mg by mouth once daily.      clotrimazole-betamethasone 1-0.05% (LOTRISONE) cream Apply to head of  Penis BId  Qty: 45 g, Refills: 3      OPW TEST CLAIM - DO NOT FILL OPW test claim. Do not fill.  Qty: 1 capsule, Refills: 0      vitamin D 1000 units Tab Take 2,000 Units by mouth once daily.             Discharge Procedure Orders (must include Diet, Follow-up, Activity)   Discharge Procedure Orders (must include Diet, Follow-up, Activity)   Activity as tolerated        Await pathology  Repeat colonoscopy 1 year if all polyps are benign.  Hold aspirin for 1 week.

## 2018-11-01 NOTE — ANESTHESIA POSTPROCEDURE EVALUATION
Anesthesia Post Evaluation    Patient: Awilda Rosenthal    Procedure(s) Performed: Procedure(s) (LRB):  COLONOSCOPY (N/A)    Final Anesthesia Type: general  Patient location during evaluation: PACU  Patient participation: Yes- Able to Participate  Level of consciousness: awake and alert and oriented  Post-procedure vital signs: reviewed and stable  Pain management: adequate  Airway patency: patent  PONV status at discharge: No PONV  Anesthetic complications: no      Cardiovascular status: blood pressure returned to baseline and hemodynamically stable  Respiratory status: unassisted, spontaneous ventilation and room air  Hydration status: euvolemic  Follow-up not needed.        Visit Vitals  BP (!) 109/58 (BP Location: Left arm, Patient Position: Lying)   Pulse 73   Temp 35.9 °C (96.6 °F) (Tympanic)   Resp 18   Ht 6' (1.829 m)   Wt 108.9 kg (240 lb)   SpO2 (!) 94%   BMI 32.55 kg/m²       Pain/Sabra Score: Pain Assessment Performed: Yes (11/1/2018 10:42 AM)  Sabra Score: 10 (11/1/2018 10:42 AM)

## 2018-11-01 NOTE — OP NOTE
Patient Name: Awilda Rosenthal   Procedure Date: 2018  MRN: [id number]  : 1950  Age: 68 y.o.  Gender: male   Referring Physician :  Dustin Pete MD  Plan for Procedure: Monitored anaesthesia care  Indication: asymptomatic screening exam  Procedure:   Colonoscopy polypectomy cold forceps and Colonoscopy polypectomy hot snare    Surgeon(s) and Role:     * Rylan Razo MD - Primary    Complications: None     Medicines: monitored anesthesia care    Procedure:  Prior to the procedure, a History and Physical was performed, and patient medications, allergies and sensitivities were reviewed.  The patient's tolerance of previous anesthesia was reviewed. The risks and benefits of the procedure and the sedation options and risks were discussed with the patient.  All questions were answered and informed consent was obtained.    After I obtained informed consent, the scope was passed under direct vision.  Throughout the procedure, the patient's blood pressure, pulse, and oxygen saturations were monitored continuously.  The Olympus scope CF - DH707Z was introduced through the anus and advanced to the terminal ileum.  The colonoscopy was performed without difficulty.  The patient tolerated the procedure well.  The quality of the bowel preparation was good     Findings:  polyp(s) #1, 5 mm in size, located in ileocecal valve removed by cold biopsy and sent for pathology  #2, 4 mm in size, located in the transverse colon removed by cold biopsy and sent for pathology  #3, 9 mm in size, located in the transverse colon removed by snare cautery and retrieved for pathology  #4, 10 mm in size, located in the transverse colon removed by snare cautery and retrieved for pathology  #5, 10 mm in size, located in the transverse colon removed by cold snare and retrieved for pathology  #6, 10 mm in size, located in the transverse colon removed by snare cautery and retrieved for pathology  #7, 8 mm in size, located in the  transverse colon removed by snare cautery and retrieved for pathology  #8, 8 mm in size, located in the transverse colon removed by snare cautery and retrieved for pathology  #9, 20 mm in size, located in the descending colon removed by snare cautery and retrieved for pathology, pulsatile bleeding from polypectomy site after polypectomy performed, 2 endoclips placed for complete hemostasis    EBL: 50 cc    Impression:  Multiple colon polyps, removed as above.    Recommendation:  Follow-up pathology  Repeat exam: 1 year if all polyps are benign  Return to my office prn  Hold aspirin for 1 week  Call for bleeding per rectum.

## 2018-11-07 ENCOUNTER — OFFICE VISIT (OUTPATIENT)
Dept: UROLOGY | Facility: CLINIC | Age: 68
End: 2018-11-07
Attending: UROLOGY
Payer: MEDICARE

## 2018-11-07 VITALS
DIASTOLIC BLOOD PRESSURE: 75 MMHG | HEART RATE: 85 BPM | WEIGHT: 240 LBS | SYSTOLIC BLOOD PRESSURE: 135 MMHG | BODY MASS INDEX: 32.51 KG/M2 | HEIGHT: 72 IN

## 2018-11-07 DIAGNOSIS — C61 PROSTATE CANCER: Primary | ICD-10-CM

## 2018-11-07 PROCEDURE — 99999 PR PBB SHADOW E&M-EST. PATIENT-LVL III: CPT | Mod: PBBFAC,,, | Performed by: UROLOGY

## 2018-11-07 PROCEDURE — 99999 PR STA SHADOW: CPT | Mod: PBBFAC,,, | Performed by: UROLOGY

## 2018-11-07 PROCEDURE — 99213 OFFICE O/P EST LOW 20 MIN: CPT | Mod: PBBFAC | Performed by: UROLOGY

## 2018-11-07 PROCEDURE — 99214 OFFICE O/P EST MOD 30 MIN: CPT | Mod: S$PBB | Performed by: UROLOGY

## 2018-11-07 NOTE — PROGRESS NOTES
Subjective:       Patient ID: Awilda Rosenthal is a 68 y.o. male.    Chief Complaint: Results and Prostate Cancer    HPI patient w no back pain bone pain weight loss etc.  ith Lenny 7 adenocarcinoma prostate by a prostate biopsy recently.  His PSA is 5.7.  He is asymptomatic.  He does have a family history of prostate cancer.    History reviewed. No pertinent past medical history.    Past Surgical History:   Procedure Laterality Date    APPENDECTOMY      BIOPSY WITH TRANSRECTAL ULTRASOUND (TRUS) GUIDANCE N/A 10/17/2018    Procedure: TRANSRECTAL PROSTATE ULTRASOUND-GUIDED NEEDLE BIOPSY;  Surgeon: Bashir Giles Jr., MD;  Location: [facility];  Service: Urology;  Laterality: N/A;    COLONOSCOPY N/A 11/1/2018    Procedure: COLONOSCOPY;  Surgeon: Rylan Razo MD;  Location: [facility];  Service: Colon and Rectal;  Laterality: N/A;    COLONOSCOPY N/A 11/1/2018    Performed by [facility] at [facility]    kidney stones      POLYPECTOMY N/A 11/1/2018    Performed by [facility] at [facility]    TRANSRECTAL PROSTATE ULTRASOUND-GUIDED NEEDLE BIOPSY N/A 10/17/2018    Performed by Bashir Giles Jr., MD at [facility]       Family History   Problem Relation Age of Onset    Heart disease Father        Social History     Socioeconomic History    Marital status: Single     Spouse name: Not on file    Number of children: Not on file    Years of education: Not on file    Highest education level: Not on file   Social Needs    Financial resource strain: Not on file    Food insecurity - worry: Not on file    Food insecurity - inability: Not on file    Transportation needs - medical: Not on file    Transportation needs - non-medical: Not on file   Occupational History     Employer: OTHER   Tobacco Use    Smoking status: Never Smoker    Smokeless tobacco: Never Used   Substance and Sexual Activity    Alcohol use: No    Drug use: No    Sexual activity: Not on file   Other Topics Concern    Not on file    Social History Narrative    Not on file       Allergies:  Patient has no known allergies.    Medications:    Current Outpatient Medications:     aspirin (ECOTRIN) 81 MG EC tablet, Take 81 mg by mouth once daily., Disp: , Rfl:     OPW TEST CLAIM - DO NOT FILL, OPW test claim. Do not fill., Disp: 1 capsule, Rfl: 0    vitamin D 1000 units Tab, Take 2,000 Units by mouth once daily., Disp: , Rfl:     Current Facility-Administered Medications:     lidocaine HCL 2% jelly, , Topical (Top), Once, Bashir Giles Jr., MD    Review of Systems   Constitutional: Negative for activity change, appetite change, chills, diaphoresis, fatigue, fever and unexpected weight change.   HENT: Negative for congestion, dental problem, hearing loss, mouth sores, postnasal drip, rhinorrhea, sinus pressure and trouble swallowing.    Eyes: Negative for pain, discharge and itching.   Respiratory: Negative for apnea, cough, choking, chest tightness, shortness of breath and wheezing.    Cardiovascular: Negative for chest pain, palpitations and leg swelling.   Gastrointestinal: Negative for abdominal distention, abdominal pain, anal bleeding, blood in stool, constipation, diarrhea, nausea, rectal pain and vomiting.   Endocrine: Negative for polydipsia and polyuria.   Genitourinary: Negative for decreased urine volume, difficulty urinating, discharge, dysuria, enuresis, flank pain, frequency, genital sores, hematuria, penile pain, penile swelling, scrotal swelling, testicular pain and urgency.   Musculoskeletal: Negative for arthralgias, back pain and myalgias.   Skin: Negative for color change, rash and wound.   Neurological: Negative for dizziness, syncope, speech difficulty, light-headedness and headaches.   Hematological: Negative for adenopathy. Does not bruise/bleed easily.   Psychiatric/Behavioral: Negative for behavioral problems, confusion, hallucinations and sleep disturbance.       Objective:      Physical Exam   Constitutional: He  appears well-developed.   HENT:   Head: Normocephalic.   Cardiovascular: Normal rate.    Pulmonary/Chest: Effort normal.   Abdominal: Soft.   Genitourinary: Prostate normal.   Neurological: He is alert.   Skin: Skin is warm.     Psychiatric: He has a normal mood and affect.       Assessment:       1. Prostate cancer        Plan:        discussed active     surveillance versus robotic surgery versus radiation in 1 former another or another.  Patient is comfortable with active surveillance so he will return in 3 months with a PSA.  At this time there is no indication for bone scan.  Will also repeat a truss with biopsy in 1 year.  Patient understands that his cancer could progress but he is comfortable with active surveillance for now    Return to clinic in 3 months with PSA

## 2019-02-13 ENCOUNTER — LAB VISIT (OUTPATIENT)
Dept: LAB | Facility: HOSPITAL | Age: 69
End: 2019-02-13
Attending: UROLOGY
Payer: MEDICARE

## 2019-02-13 DIAGNOSIS — C61 PROSTATE CANCER: ICD-10-CM

## 2019-02-13 LAB — COMPLEXED PSA SERPL-MCNC: 4.9 NG/ML

## 2019-02-13 PROCEDURE — 84153 ASSAY OF PSA TOTAL: CPT

## 2019-02-13 PROCEDURE — 36415 COLL VENOUS BLD VENIPUNCTURE: CPT

## 2019-02-20 ENCOUNTER — OFFICE VISIT (OUTPATIENT)
Dept: UROLOGY | Facility: CLINIC | Age: 69
End: 2019-02-20
Attending: UROLOGY
Payer: MEDICARE

## 2019-02-20 VITALS
BODY MASS INDEX: 32.52 KG/M2 | SYSTOLIC BLOOD PRESSURE: 130 MMHG | DIASTOLIC BLOOD PRESSURE: 83 MMHG | WEIGHT: 240.06 LBS | HEART RATE: 99 BPM | HEIGHT: 72 IN

## 2019-02-20 DIAGNOSIS — C61 PROSTATE CANCER: Primary | ICD-10-CM

## 2019-02-20 PROCEDURE — 99999 PR PBB SHADOW E&M-EST. PATIENT-LVL III: CPT | Mod: PBBFAC,,, | Performed by: UROLOGY

## 2019-02-20 PROCEDURE — 99213 OFFICE O/P EST LOW 20 MIN: CPT | Mod: PBBFAC | Performed by: UROLOGY

## 2019-02-20 PROCEDURE — 99999 PR STA SHADOW: CPT | Mod: PBBFAC,,, | Performed by: UROLOGY

## 2019-02-20 PROCEDURE — 99214 OFFICE O/P EST MOD 30 MIN: CPT | Mod: S$PBB | Performed by: UROLOGY

## 2019-02-20 PROCEDURE — 99999 PR STA SHADOW: ICD-10-PCS | Mod: PBBFAC,,, | Performed by: UROLOGY

## 2019-02-20 NOTE — PROGRESS NOTES
Subjective:       Patient ID: Awilda Rosenthal is a 68 y.o. male.    Chief Complaint: Elevated PSA (3mth ck )    HPI patient is here with prostate cancer on active surveillance.  He is feeling well and his PSA came down from 5.7 to 4.9.  He is voiding well has no back or bone pain etc    History reviewed. No pertinent past medical history.    Past Surgical History:   Procedure Laterality Date    APPENDECTOMY      COLONOSCOPY N/A 11/1/2018    Performed by [facility]ylan [facility]azo MD at [facility] ENDO    kidney stones      POLYPECTOMY N/A 11/1/2018    Performed by Rylan Razo MD at [facility] ENDO    TRANSRECTAL PROSTATE ULTRASOUND-GUIDED NEEDLE BIOPSY N/A 10/17/2018    Performed by Bashir Giles Jr., MD at [facility] OR       Family History   Problem Relation Age of Onset    Heart disease Father        Social History     Socioeconomic History    Marital status: Single     Spouse name: Not on file    Number of children: Not on file    Years of education: Not on file    Highest education level: Not on file   Social Needs    Financial resource strain: Not on file    Food insecurity - worry: Not on file    Food insecurity - inability: Not on file    Transportation needs - medical: Not on file    Transportation needs - non-medical: Not on file   Occupational History     Employer: OTHER   Tobacco Use    Smoking status: Never Smoker    Smokeless tobacco: Never Used   Substance and Sexual Activity    Alcohol use: No    Drug use: No    Sexual activity: Not on file   Other Topics Concern    Not on file   Social History Narrative    Not on file       Allergies:  Patient has no known allergies.    Medications:    Current Outpatient Medications:     aspirin (ECOTRIN) 81 MG EC tablet, Take 81 mg by mouth once daily., Disp: , Rfl:     OPW TEST CLAIM - DO NOT FILL, OPW test claim. Do not fill., Disp: 1 capsule, Rfl: 0    vitamin D 1000 units Tab, Take 2,000 Units by mouth once daily., Disp: , Rfl:     Current  Facility-Administered Medications:     lidocaine HCL 2% jelly, , Topical (Top), Once, Bashir Giles Jr., MD    Review of Systems   Constitutional: Negative for activity change, appetite change, chills, diaphoresis, fatigue, fever and unexpected weight change.   HENT: Negative for congestion, dental problem, hearing loss, mouth sores, postnasal drip, rhinorrhea, sinus pressure and trouble swallowing.    Eyes: Negative for pain, discharge and itching.   Respiratory: Negative for apnea, cough, choking, chest tightness, shortness of breath and wheezing.    Cardiovascular: Negative for chest pain, palpitations and leg swelling.   Gastrointestinal: Negative for abdominal distention, abdominal pain, anal bleeding, blood in stool, constipation, diarrhea, nausea, rectal pain and vomiting.   Endocrine: Negative for polydipsia and polyuria.   Genitourinary: Negative for decreased urine volume, difficulty urinating, discharge, dysuria, enuresis, flank pain, frequency, genital sores, hematuria, penile pain, penile swelling, scrotal swelling, testicular pain and urgency.   Musculoskeletal: Negative for arthralgias, back pain and myalgias.   Skin: Negative for color change, rash and wound.   Neurological: Negative for dizziness, syncope, speech difficulty, light-headedness and headaches.   Hematological: Negative for adenopathy. Does not bruise/bleed easily.   Psychiatric/Behavioral: Negative for behavioral problems, confusion, hallucinations and sleep disturbance.       Objective:      Physical Exam   Constitutional: He appears well-developed.   HENT:   Head: Normocephalic.   Cardiovascular: Normal rate.    Pulmonary/Chest: Effort normal.   Abdominal: Soft.   Genitourinary: Prostate normal.   Genitourinary Comments: 30 g benign no nodules   Neurological: He is alert.   Skin: Skin is warm.     Psychiatric: He has a normal mood and affect.       Assessment:       1. Prostate cancer        Plan:       Awilda was seen today for  elevated psa.    Diagnoses and all orders for this visit:    Prostate cancer  -     Prostate Specific Antigen, Diagnostic; Future     Return to clinic 4 months with PSA.  Will do repeat biopsy after 1 year

## 2019-04-25 RX ORDER — CLOTRIMAZOLE AND BETAMETHASONE DIPROPIONATE 10; .64 MG/G; MG/G
CREAM TOPICAL
Qty: 45 G | Refills: 1 | Status: SHIPPED | OUTPATIENT
Start: 2019-04-25 | End: 2021-03-02 | Stop reason: SDUPTHER

## 2019-06-26 ENCOUNTER — LAB VISIT (OUTPATIENT)
Dept: LAB | Facility: HOSPITAL | Age: 69
End: 2019-06-26
Attending: UROLOGY
Payer: MEDICARE

## 2019-06-26 ENCOUNTER — OFFICE VISIT (OUTPATIENT)
Dept: UROLOGY | Facility: CLINIC | Age: 69
End: 2019-06-26
Attending: UROLOGY
Payer: MEDICARE

## 2019-06-26 DIAGNOSIS — R97.20 ELEVATED PSA: Primary | ICD-10-CM

## 2019-06-26 DIAGNOSIS — C61 PROSTATE CANCER: ICD-10-CM

## 2019-06-26 LAB — COMPLEXED PSA SERPL-MCNC: 8.3 NG/ML (ref 0–4)

## 2019-06-26 PROCEDURE — 99211 OFF/OP EST MAY X REQ PHY/QHP: CPT | Mod: PBBFAC | Performed by: UROLOGY

## 2019-06-26 PROCEDURE — 84153 ASSAY OF PSA TOTAL: CPT

## 2019-06-26 PROCEDURE — 99999 PR STA SHADOW: CPT | Mod: PBBFAC,,, | Performed by: UROLOGY

## 2019-06-26 PROCEDURE — 36415 COLL VENOUS BLD VENIPUNCTURE: CPT

## 2019-06-26 PROCEDURE — 99999 PR PBB SHADOW E&M-EST. PATIENT-LVL I: ICD-10-PCS | Mod: PBBFAC,,, | Performed by: UROLOGY

## 2019-06-26 PROCEDURE — 99999 PR PBB SHADOW E&M-EST. PATIENT-LVL I: CPT | Mod: PBBFAC,,, | Performed by: UROLOGY

## 2019-07-01 ENCOUNTER — TELEPHONE (OUTPATIENT)
Dept: UROLOGY | Facility: CLINIC | Age: 69
End: 2019-07-01

## 2019-07-01 NOTE — TELEPHONE ENCOUNTER
----- Message from Reny Mcleod LPN sent at 6/27/2019  9:21 AM CDT -----      ----- Message -----  From: Bashir Giles Jr., MD  Sent: 6/27/2019   6:45 AM  To: Tisha LOAIZA Jr Staff     psa  Needs appt

## 2019-07-10 ENCOUNTER — OFFICE VISIT (OUTPATIENT)
Dept: UROLOGY | Facility: CLINIC | Age: 69
End: 2019-07-10
Attending: UROLOGY
Payer: MEDICARE

## 2019-07-10 VITALS
RESPIRATION RATE: 16 BRPM | WEIGHT: 240.06 LBS | HEART RATE: 87 BPM | HEIGHT: 72 IN | BODY MASS INDEX: 32.52 KG/M2 | DIASTOLIC BLOOD PRESSURE: 80 MMHG | SYSTOLIC BLOOD PRESSURE: 122 MMHG

## 2019-07-10 DIAGNOSIS — C61 PROSTATE CANCER: Primary | ICD-10-CM

## 2019-07-10 PROCEDURE — 99999 PR STA SHADOW: CPT | Mod: PBBFAC,,, | Performed by: UROLOGY

## 2019-07-10 PROCEDURE — 99213 OFFICE O/P EST LOW 20 MIN: CPT | Mod: PBBFAC | Performed by: UROLOGY

## 2019-07-10 PROCEDURE — 99999 PR PBB SHADOW E&M-EST. PATIENT-LVL III: CPT | Mod: PBBFAC,,, | Performed by: UROLOGY

## 2019-07-10 PROCEDURE — 99214 OFFICE O/P EST MOD 30 MIN: CPT | Mod: S$PBB | Performed by: UROLOGY

## 2019-07-10 PROCEDURE — 99999 PR PBB SHADOW E&M-EST. PATIENT-LVL III: ICD-10-PCS | Mod: PBBFAC,,, | Performed by: UROLOGY

## 2019-07-10 NOTE — PROGRESS NOTES
Subjective:       Patient ID: Awilda Rosenthal is a 68 y.o. male.    Chief Complaint:  patient has prostate cancer .  His PSA is 8.1.  It was as high as 6.6 in the past.  He is voiding well and has no complaints.  No back pain bone pain weight loss.  He is due for repeat truss with biopsy in October  active waiting  HPI     History reviewed. No pertinent past medical history.    Past Surgical History:   Procedure Laterality Date    APPENDECTOMY      COLONOSCOPY N/A 11/1/2018    [facility]    kidney stones      POLYPECTOMY N/A 11/1/2018    [facility]    TRANSRECTAL PROSTATE ULTRASOUND-GUIDED NEEDLE BIOPSY N/A 10/17/2018    [facility]       Family History   Problem Relation Age of Onset    Heart disease Father        Social History     Socioeconomic History    Marital status: Single     Spouse name: Not on file    Number of children: Not on file    Years of education: Not on file    Highest education level: Not on file   Occupational History     Employer: OTHER   Social Needs    Financial resource strain: Not on file    Food insecurity:     Worry: Not on file     Inability: Not on file    Transportation needs:     Medical: Not on file     Non-medical: Not on file   Tobacco Use    Smoking status: Never Smoker    Smokeless tobacco: Never Used   Substance and Sexual Activity    Alcohol use: No    Drug use: No    Sexual activity: Not on file   Lifestyle    Physical activity:     Days per week: Not on file     Minutes per session: Not on file    Stress: Not on file   Relationships    Social connections:     Talks on phone: Not on file     Gets together: Not on file     Attends Sabianist service: Not on file     Active member of club or organization: Not on file     Attends meetings of clubs or organizations: Not on file     Relationship status: Not on file   Other Topics Concern    Not on file    Social History Narrative    Not on file       Allergies:  Patient has no known allergies.    Medications:    Current Outpatient Medications:     aspirin (ECOTRIN) 81 MG EC tablet, Take 81 mg by mouth once daily., Disp: , Rfl:     clotrimazole-betamethasone 1-0.05% (LOTRISONE) cream, Apply to head of  Penis BId, Disp: 45 g, Rfl: 1    vitamin D 1000 units Tab, Take 2,000 Units by mouth once daily., Disp: , Rfl:     Current Facility-Administered Medications:     lidocaine HCL 2% jelly, , Topical (Top), Once, Bashir Giles Jr., MD    Review of Systems   Constitutional: Negative for activity change, appetite change, chills, diaphoresis, fatigue, fever and unexpected weight change.   HENT: Negative for congestion, dental problem, hearing loss, mouth sores, postnasal drip, rhinorrhea, sinus pressure and trouble swallowing.    Eyes: Negative for pain, discharge and itching.   Respiratory: Negative for apnea, cough, choking, chest tightness, shortness of breath and wheezing.    Cardiovascular: Negative for chest pain, palpitations and leg swelling.   Gastrointestinal: Negative for abdominal distention, abdominal pain, anal bleeding, blood in stool, constipation, diarrhea, nausea, rectal pain and vomiting.   Endocrine: Negative for polydipsia and polyuria.   Genitourinary: Negative for decreased urine volume, difficulty urinating, discharge, dysuria, enuresis, flank pain, frequency, genital sores, hematuria, penile pain, penile swelling, scrotal swelling, testicular pain and urgency.   Musculoskeletal: Negative for arthralgias, back pain and myalgias.   Skin: Negative for color change, rash and wound.   Neurological: Negative for dizziness, syncope, speech difficulty, light-headedness and headaches.   Hematological: Negative for adenopathy. Does not bruise/bleed easily.   Psychiatric/Behavioral: Negative for behavioral problems, confusion, hallucinations and sleep disturbance.       Objective:      Physical Exam    Constitutional: He appears well-developed.   HENT:   Head: Normocephalic.   Cardiovascular: Normal rate.    Pulmonary/Chest: Effort normal.   Abdominal: Soft.   Genitourinary: Prostate normal.   Genitourinary Comments: 35 g benign no nodules   Neurological: He is alert.   Skin: Skin is warm.     Psychiatric: He has a normal mood and affect.       Assessment:       1. Prostate cancer        Plan:       Awilda was seen today for follow-up.    Diagnoses and all orders for this visit:    Prostate cancer  -     Prostate Specific Antigen, Diagnostic; Future  -     Prostate Specific Antigen, Diagnostic; Future     Return to clinic in 3 months with a PSA and to schedule truss with biopsy

## 2019-09-03 ENCOUNTER — TELEPHONE (OUTPATIENT)
Dept: ADMINISTRATIVE | Facility: HOSPITAL | Age: 69
End: 2019-09-03

## 2019-09-04 ENCOUNTER — TELEPHONE (OUTPATIENT)
Dept: FAMILY MEDICINE | Facility: CLINIC | Age: 69
End: 2019-09-04

## 2019-09-16 ENCOUNTER — OFFICE VISIT (OUTPATIENT)
Dept: FAMILY MEDICINE | Facility: CLINIC | Age: 69
End: 2019-09-16
Payer: MEDICARE

## 2019-09-16 VITALS
HEIGHT: 72 IN | RESPIRATION RATE: 20 BRPM | WEIGHT: 265 LBS | HEART RATE: 80 BPM | BODY MASS INDEX: 35.89 KG/M2 | SYSTOLIC BLOOD PRESSURE: 126 MMHG | DIASTOLIC BLOOD PRESSURE: 70 MMHG

## 2019-09-16 DIAGNOSIS — N52.9 MALE ERECTILE DISORDER: Primary | ICD-10-CM

## 2019-09-16 DIAGNOSIS — Z12.11 SCREENING FOR COLON CANCER: ICD-10-CM

## 2019-09-16 DIAGNOSIS — D36.9 TUBULAR ADENOMA: ICD-10-CM

## 2019-09-16 DIAGNOSIS — R97.20 ELEVATED PSA: ICD-10-CM

## 2019-09-16 DIAGNOSIS — J40 BRONCHITIS: ICD-10-CM

## 2019-09-16 PROCEDURE — 99999 PR STA SHADOW: CPT | Mod: PBBFAC,,, | Performed by: FAMILY MEDICINE

## 2019-09-16 PROCEDURE — 99999 PR PBB SHADOW E&M-EST. PATIENT-LVL III: CPT | Mod: PBBFAC,,, | Performed by: FAMILY MEDICINE

## 2019-09-16 PROCEDURE — 99999 PR PBB SHADOW E&M-EST. PATIENT-LVL III: ICD-10-PCS | Mod: PBBFAC,,, | Performed by: FAMILY MEDICINE

## 2019-09-16 PROCEDURE — 99213 OFFICE O/P EST LOW 20 MIN: CPT | Mod: PBBFAC | Performed by: FAMILY MEDICINE

## 2019-09-16 PROCEDURE — 99214 OFFICE O/P EST MOD 30 MIN: CPT | Mod: S$PBB | Performed by: FAMILY MEDICINE

## 2019-09-16 RX ORDER — TADALAFIL 20 MG/1
20 TABLET ORAL DAILY PRN
Qty: 8 TABLET | Refills: 5 | Status: SHIPPED | OUTPATIENT
Start: 2019-09-16 | End: 2020-09-04

## 2019-09-16 RX ORDER — PROMETHAZINE HYDROCHLORIDE AND DEXTROMETHORPHAN HYDROBROMIDE 6.25; 15 MG/5ML; MG/5ML
5 SYRUP ORAL 3 TIMES DAILY PRN
Qty: 150 ML | Refills: 3 | Status: SHIPPED | OUTPATIENT
Start: 2019-09-16 | End: 2019-09-26

## 2019-09-16 RX ORDER — AZITHROMYCIN 250 MG/1
TABLET, FILM COATED ORAL
Qty: 6 TABLET | Refills: 0 | Status: SHIPPED | OUTPATIENT
Start: 2019-09-16 | End: 2019-10-16 | Stop reason: ALTCHOICE

## 2019-09-16 NOTE — PROGRESS NOTES
Subjective:       Patient ID: Awilda Rosenthal is a 68 y.o. male.    Chief Complaint: Follow-up    Pt is a 68 y.o. male who presents for check up for prostate c s  . Doing well on current meds. Denies any side effects. Prevention is up to date.    Review of Systems   Constitutional: Negative for appetite change.   HENT: Negative for congestion, ear pain, sneezing and sore throat.    Eyes: Negative for redness and visual disturbance.   Respiratory: Negative for cough, chest tightness and stridor.    Cardiovascular: Negative for chest pain.   Gastrointestinal: Negative for abdominal pain, blood in stool, diarrhea, nausea and vomiting.   Genitourinary: Negative for difficulty urinating, dysuria and hematuria.        Prostate C S   Musculoskeletal: Negative for arthralgias, back pain, joint swelling, myalgias and neck pain.   Skin: Negative for rash.   Neurological: Negative for dizziness.   Psychiatric/Behavioral: Negative for agitation. The patient is not nervous/anxious.        Objective:      Physical Exam   Constitutional: He is oriented to person, place, and time. He appears well-developed.   Obese 67 y/o W M   HENT:   Head: Normocephalic.   Eyes: Pupils are equal, round, and reactive to light.   Neck: Normal range of motion. Neck supple. No thyromegaly present.   Cardiovascular: Normal rate and regular rhythm. Exam reveals no friction rub.   No murmur heard.  Pulmonary/Chest: Effort normal. No respiratory distress. He has no wheezes.   Abdominal: There is no tenderness. There is no rebound and no guarding.   Musculoskeletal: Normal range of motion. He exhibits no edema or tenderness.   Lymphadenopathy:     He has no cervical adenopathy.   Neurological: He is alert and oriented to person, place, and time. He has normal reflexes. No cranial nerve deficit.   Skin: Skin is warm and dry.   Psychiatric: He has a normal mood and affect. Judgment and thought content normal.       Assessment:       1. Male erectile  disorder    2. Elevated PSA    3. Tubular adenoma    4. Bronchitis        Plan:   Awilda was seen today for follow-up.    Diagnoses and all orders for this visit:    Male erectile disorder  -     tadalafil (CIALIS) 20 MG Tab; Take 1 tablet (20 mg total) by mouth daily as needed.    Elevated PSA    Tubular adenoma  -     Ambulatory referral to Colorectal Surgery    Bronchitis  -     promethazine-dextromethorphan (PROMETHAZINE-DM) 6.25-15 mg/5 mL Syrp; Take 5 mLs by mouth 3 (three) times daily as needed.    Other orders  -     azithromycin (Z-KADI) 250 MG tablet; Take 2 orally day #1, then 1 po qd for  5 days total

## 2019-10-14 ENCOUNTER — LAB VISIT (OUTPATIENT)
Dept: LAB | Facility: HOSPITAL | Age: 69
End: 2019-10-14
Attending: UROLOGY
Payer: MEDICARE

## 2019-10-14 DIAGNOSIS — C61 PROSTATE CANCER: ICD-10-CM

## 2019-10-14 LAB — COMPLEXED PSA SERPL-MCNC: 6.2 NG/ML (ref 0–4)

## 2019-10-14 PROCEDURE — 36415 COLL VENOUS BLD VENIPUNCTURE: CPT

## 2019-10-14 PROCEDURE — 84153 ASSAY OF PSA TOTAL: CPT

## 2019-10-15 ENCOUNTER — PATIENT OUTREACH (OUTPATIENT)
Dept: ADMINISTRATIVE | Facility: OTHER | Age: 69
End: 2019-10-15

## 2019-10-15 RX ORDER — BISACODYL 5 MG
20 TABLET, DELAYED RELEASE (ENTERIC COATED) ORAL ONCE
Qty: 4 TABLET | Refills: 0 | Status: SHIPPED | OUTPATIENT
Start: 2019-10-23 | End: 2019-10-24 | Stop reason: HOSPADM

## 2019-10-15 RX ORDER — SODIUM, POTASSIUM,MAG SULFATES 17.5-3.13G
354 SOLUTION, RECONSTITUTED, ORAL ORAL ONCE
Qty: 354 ML | Refills: 0 | Status: SHIPPED | OUTPATIENT
Start: 2019-10-23 | End: 2019-10-24 | Stop reason: HOSPADM

## 2019-10-16 ENCOUNTER — HOSPITAL ENCOUNTER (OUTPATIENT)
Dept: PREADMISSION TESTING | Facility: HOSPITAL | Age: 69
Discharge: HOME | End: 2019-10-16
Attending: COLON & RECTAL SURGERY
Payer: MEDICARE

## 2019-10-16 ENCOUNTER — ANESTHESIA EVENT (OUTPATIENT)
Dept: ENDOSCOPY | Facility: HOSPITAL | Age: 69
End: 2019-10-16
Payer: MEDICARE

## 2019-10-16 ENCOUNTER — OFFICE VISIT (OUTPATIENT)
Dept: UROLOGY | Facility: CLINIC | Age: 69
End: 2019-10-16
Attending: UROLOGY
Payer: MEDICARE

## 2019-10-16 VITALS
HEIGHT: 72 IN | WEIGHT: 265 LBS | BODY MASS INDEX: 35.89 KG/M2 | HEART RATE: 83 BPM | DIASTOLIC BLOOD PRESSURE: 86 MMHG | SYSTOLIC BLOOD PRESSURE: 127 MMHG | RESPIRATION RATE: 16 BRPM

## 2019-10-16 DIAGNOSIS — C61 PROSTATE CANCER: Primary | ICD-10-CM

## 2019-10-16 PROCEDURE — 99213 OFFICE O/P EST LOW 20 MIN: CPT | Mod: PBBFAC | Performed by: UROLOGY

## 2019-10-16 PROCEDURE — 99999 PR PBB SHADOW E&M-EST. PATIENT-LVL III: ICD-10-PCS | Mod: PBBFAC,,, | Performed by: UROLOGY

## 2019-10-16 PROCEDURE — 99999 PR STA SHADOW: CPT | Mod: PBBFAC,,, | Performed by: UROLOGY

## 2019-10-16 PROCEDURE — 99999 PR PBB SHADOW E&M-EST. PATIENT-LVL III: CPT | Mod: PBBFAC,,, | Performed by: UROLOGY

## 2019-10-16 PROCEDURE — 99213 OFFICE O/P EST LOW 20 MIN: CPT | Mod: S$PBB | Performed by: UROLOGY

## 2019-10-16 RX ORDER — SULFAMETHOXAZOLE AND TRIMETHOPRIM 800; 160 MG/1; MG/1
TABLET ORAL
Qty: 4 TABLET | Refills: 0 | Status: SHIPPED | OUTPATIENT
Start: 2019-10-16 | End: 2019-10-16 | Stop reason: ALTCHOICE

## 2019-10-16 NOTE — H&P (VIEW-ONLY)
Subjective:       Patient ID: Awilda Rosenthal is a 68 y.o. male.    Chief Complaint: Follow-up    HPI patient has adenocarcinoma prostate and is on active surveillance.  He had 1 positive biopsy.  His PSA is actually down to 6.2.  He is voiding well without complaints.  He is here to schedule a repeat truss with biopsy    History reviewed. No pertinent past medical history.    Past Surgical History:   Procedure Laterality Date    APPENDECTOMY      BIOPSY WITH TRANSRECTAL ULTRASOUND (TRUS) GUIDANCE N/A 10/17/2018    Procedure: TRANSRECTAL PROSTATE ULTRASOUND-GUIDED NEEDLE BIOPSY;  Surgeon: Bashir Giles Jr., MD;  Location: [facility] OR;  Service: Urology;  Laterality: N/A;    COLONOSCOPY N/A 11/1/2018    Procedure: COLONOSCOPY;  Surgeon: Rylan Razo MD;  Location: [facility] ENDO;  Service: Colon and Rectal;  Laterality: N/A;    kidney stones         Family History   Problem Relation Age of Onset    Heart disease Father        Social History     Socioeconomic History    Marital status: Single     Spouse name: Not on file    Number of children: Not on file    Years of education: Not on file    Highest education level: Not on file   Occupational History     Employer: OTHER   Social Needs    Financial resource strain: Not on file    Food insecurity:     Worry: Not on file     Inability: Not on file    Transportation needs:     Medical: Not on file     Non-medical: Not on file   Tobacco Use    Smoking status: Never Smoker    Smokeless tobacco: Never Used   Substance and Sexual Activity    Alcohol use: No    Drug use: No    Sexual activity: Not on file   Lifestyle    Physical activity:     Days per week: Not on file     Minutes per session: Not on file    Stress: Not on file   Relationships    Social connections:     Talks on phone: Not on file     Gets together: Not on file     Attends Restoration service: Not on file     Active member of club or organization: Not on file     Attends meetings of clubs or  organizations: Not on file     Relationship status: Not on file   Other Topics Concern    Not on file   Social History Narrative    Not on file       Allergies:  Patient has no known allergies.    Medications:    Current Outpatient Medications:     aspirin (ECOTRIN) 81 MG EC tablet, Take 81 mg by mouth once daily., Disp: , Rfl:     azithromycin (Z-KADI) 250 MG tablet, Take 2 orally day #1, then 1 po qd for  5 days total, Disp: 6 tablet, Rfl: 0    [START ON 10/23/2019] bisacodyl (DULCOLAX) 5 mg EC tablet, Take 4 tablets (20 mg total) by mouth once. Per Dr. Razo instruction sheet for 1 dose, Disp: 4 tablet, Rfl: 0    clotrimazole-betamethasone 1-0.05% (LOTRISONE) cream, Apply to head of  Penis BId, Disp: 45 g, Rfl: 1    [START ON 10/23/2019] polyethylene glycol (COLYTE) 240-22.72-6.72 -5.84 gram SolR, Take 354 mLs by mouth once. for 1 dose, Disp: 4000 mL, Rfl: 0    tadalafil (CIALIS) 20 MG Tab, Take 1 tablet (20 mg total) by mouth daily as needed., Disp: 8 tablet, Rfl: 5    vitamin D 1000 units Tab, Take 2,000 Units by mouth once daily., Disp: , Rfl:     sulfamethoxazole-trimethoprim 800-160mg (BACTRIM DS) 800-160 mg Tab, Start night before prostate biopsy every 12 hours for 4 doses only, Disp: 4 tablet, Rfl: 0    Current Facility-Administered Medications:     lidocaine HCL 2% jelly, , Topical (Top), Once, Bashir Giles Jr., MD    Review of Systems   Constitutional: Negative for activity change, appetite change, chills, diaphoresis, fatigue, fever and unexpected weight change.   HENT: Negative for congestion, dental problem, hearing loss, mouth sores, postnasal drip, rhinorrhea, sinus pressure and trouble swallowing.    Eyes: Negative for pain, discharge and itching.   Respiratory: Negative for apnea, cough, choking, chest tightness, shortness of breath and wheezing.    Cardiovascular: Negative for chest pain, palpitations and leg swelling.   Gastrointestinal: Negative for abdominal distention, abdominal  pain, anal bleeding, blood in stool, constipation, diarrhea, nausea, rectal pain and vomiting.   Endocrine: Negative for polydipsia and polyuria.   Genitourinary: Negative for decreased urine volume, difficulty urinating, discharge, dysuria, enuresis, flank pain, frequency, genital sores, hematuria, penile pain, penile swelling, scrotal swelling, testicular pain and urgency.   Musculoskeletal: Negative for arthralgias, back pain and myalgias.   Skin: Negative for color change, rash and wound.   Neurological: Negative for dizziness, syncope, speech difficulty, light-headedness and headaches.   Hematological: Negative for adenopathy. Does not bruise/bleed easily.   Psychiatric/Behavioral: Negative for behavioral problems, confusion, hallucinations and sleep disturbance.       Objective:      Physical Exam   Constitutional: He appears well-developed.   HENT:   Head: Normocephalic.   Cardiovascular: Normal rate.    Pulmonary/Chest: Effort normal.   Abdominal: Soft.   Genitourinary: Prostate normal.   Neurological: He is alert.   Skin: Skin is warm.     Psychiatric: He has a normal mood and affect.       Assessment:       1. Prostate cancer        Plan:       Awilda was seen today for follow-up.    Diagnoses and all orders for this visit:    Prostate cancer    Other orders  -     sulfamethoxazole-trimethoprim 800-160mg (BACTRIM DS) 800-160 mg Tab; Start night before prostate biopsy every 12 hours for 4 doses only     Truss with biopsy.  Patient will hold his aspirin for 5 days prior

## 2019-10-16 NOTE — PROGRESS NOTES
Subjective:       Patient ID: Awilda Rosenthal is a 68 y.o. male.    Chief Complaint: Follow-up    HPI patient has adenocarcinoma prostate and is on active surveillance.  He had 1 positive biopsy.  His PSA is actually down to 6.2.  He is voiding well without complaints.  He is here to schedule a repeat truss with biopsy    History reviewed. No pertinent past medical history.    Past Surgical History:   Procedure Laterality Date    APPENDECTOMY      BIOPSY WITH TRANSRECTAL ULTRASOUND (TRUS) GUIDANCE N/A 10/17/2018    Procedure: TRANSRECTAL PROSTATE ULTRASOUND-GUIDED NEEDLE BIOPSY;  Surgeon: Bashir Giles Jr., MD;  Location: [facility] OR;  Service: Urology;  Laterality: N/A;    COLONOSCOPY N/A 11/1/2018    Procedure: COLONOSCOPY;  Surgeon: Rylan Razo MD;  Location: [facility] ENDO;  Service: Colon and Rectal;  Laterality: N/A;    kidney stones         Family History   Problem Relation Age of Onset    Heart disease Father        Social History     Socioeconomic History    Marital status: Single     Spouse name: Not on file    Number of children: Not on file    Years of education: Not on file    Highest education level: Not on file   Occupational History     Employer: OTHER   Social Needs    Financial resource strain: Not on file    Food insecurity:     Worry: Not on file     Inability: Not on file    Transportation needs:     Medical: Not on file     Non-medical: Not on file   Tobacco Use    Smoking status: Never Smoker    Smokeless tobacco: Never Used   Substance and Sexual Activity    Alcohol use: No    Drug use: No    Sexual activity: Not on file   Lifestyle    Physical activity:     Days per week: Not on file     Minutes per session: Not on file    Stress: Not on file   Relationships    Social connections:     Talks on phone: Not on file     Gets together: Not on file     Attends Holiness service: Not on file     Active member of club or organization: Not on file     Attends meetings of clubs or  organizations: Not on file     Relationship status: Not on file   Other Topics Concern    Not on file   Social History Narrative    Not on file       Allergies:  Patient has no known allergies.    Medications:    Current Outpatient Medications:     aspirin (ECOTRIN) 81 MG EC tablet, Take 81 mg by mouth once daily., Disp: , Rfl:     azithromycin (Z-KADI) 250 MG tablet, Take 2 orally day #1, then 1 po qd for  5 days total, Disp: 6 tablet, Rfl: 0    [START ON 10/23/2019] bisacodyl (DULCOLAX) 5 mg EC tablet, Take 4 tablets (20 mg total) by mouth once. Per Dr. Razo instruction sheet for 1 dose, Disp: 4 tablet, Rfl: 0    clotrimazole-betamethasone 1-0.05% (LOTRISONE) cream, Apply to head of  Penis BId, Disp: 45 g, Rfl: 1    [START ON 10/23/2019] polyethylene glycol (COLYTE) 240-22.72-6.72 -5.84 gram SolR, Take 354 mLs by mouth once. for 1 dose, Disp: 4000 mL, Rfl: 0    tadalafil (CIALIS) 20 MG Tab, Take 1 tablet (20 mg total) by mouth daily as needed., Disp: 8 tablet, Rfl: 5    vitamin D 1000 units Tab, Take 2,000 Units by mouth once daily., Disp: , Rfl:     sulfamethoxazole-trimethoprim 800-160mg (BACTRIM DS) 800-160 mg Tab, Start night before prostate biopsy every 12 hours for 4 doses only, Disp: 4 tablet, Rfl: 0    Current Facility-Administered Medications:     lidocaine HCL 2% jelly, , Topical (Top), Once, Bashir Giles Jr., MD    Review of Systems   Constitutional: Negative for activity change, appetite change, chills, diaphoresis, fatigue, fever and unexpected weight change.   HENT: Negative for congestion, dental problem, hearing loss, mouth sores, postnasal drip, rhinorrhea, sinus pressure and trouble swallowing.    Eyes: Negative for pain, discharge and itching.   Respiratory: Negative for apnea, cough, choking, chest tightness, shortness of breath and wheezing.    Cardiovascular: Negative for chest pain, palpitations and leg swelling.   Gastrointestinal: Negative for abdominal distention, abdominal  pain, anal bleeding, blood in stool, constipation, diarrhea, nausea, rectal pain and vomiting.   Endocrine: Negative for polydipsia and polyuria.   Genitourinary: Negative for decreased urine volume, difficulty urinating, discharge, dysuria, enuresis, flank pain, frequency, genital sores, hematuria, penile pain, penile swelling, scrotal swelling, testicular pain and urgency.   Musculoskeletal: Negative for arthralgias, back pain and myalgias.   Skin: Negative for color change, rash and wound.   Neurological: Negative for dizziness, syncope, speech difficulty, light-headedness and headaches.   Hematological: Negative for adenopathy. Does not bruise/bleed easily.   Psychiatric/Behavioral: Negative for behavioral problems, confusion, hallucinations and sleep disturbance.       Objective:      Physical Exam   Constitutional: He appears well-developed.   HENT:   Head: Normocephalic.   Cardiovascular: Normal rate.    Pulmonary/Chest: Effort normal.   Abdominal: Soft.   Genitourinary: Prostate normal.   Neurological: He is alert.   Skin: Skin is warm.     Psychiatric: He has a normal mood and affect.       Assessment:       1. Prostate cancer        Plan:       Awilda was seen today for follow-up.    Diagnoses and all orders for this visit:    Prostate cancer    Other orders  -     sulfamethoxazole-trimethoprim 800-160mg (BACTRIM DS) 800-160 mg Tab; Start night before prostate biopsy every 12 hours for 4 doses only     Truss with biopsy.  Patient will hold his aspirin for 5 days prior

## 2019-10-16 NOTE — DISCHARGE INSTRUCTIONS
Colonoscopy Outpatient procedure instructions    Prep Review  Follow instructions as written on Dr. Razo's Colonoscopy Prep Sheet  Dr. Razo patients have nothing to eat or drink after morning prep is complete      Take medications as instructed by your doctor.    Wear something comfortable that is easy for you to take off and put on.   Do not wear any makeup, jewelry, or body piercings. Leave valuables at home or let your family member keep them for you. Do not bring them to the Surgery area.     Date/Day of Procedure: Thursday 10/24/19    Arrival time: Someone will call you the workday day before the procedure  to give you an arrival time   If asked to report to the hospital before 7:00 am report to the Emergency Room.  If asked to report to the hospital at 7:00 a.m. or later report to Patient Registration  It is not necessary to report earlier than the time you are told.   Ignore any automated/computer generated calls telling to what time to report to the hospital.   Plan to be at the hospital for about 4 hours, however, it could be longer.

## 2019-10-16 NOTE — ANESTHESIA PREPROCEDURE EVALUATION
10/16/2019  Awilda Rosenthal is a 68 y.o., male.    Anesthesia Evaluation    I have reviewed the Patient Summary Reports.    I have reviewed the Nursing Notes.   I have reviewed the Medications.     Review of Systems  Anesthesia Hx:  No problems with previous Anesthesia  History of prior surgery of interest to airway management or planning:  Denies Personal Hx of Anesthesia complications.   Social:  Non-Smoker, No Alcohol Use    Hematology/Oncology:  Hematology Normal      Current/Recent Cancer. Other (see Oncology comments) Oncology Comments: Current prostate CA    EENT/Dental:EENT/Dental Normal   Cardiovascular:  Cardiovascular Normal Exercise tolerance: good     Pulmonary:  Pulmonary Normal    Renal/:   renal calculi Prostate CA   Hepatic/GI:  Hepatic/GI Normal    Musculoskeletal:  Musculoskeletal Normal    Neurological:  Neurology Normal    Endocrine:  Endocrine Normal    Dermatological:  Skin Normal    Psych:  Psychiatric Normal           Physical Exam  General:  Obesity    Airway/Jaw/Neck:  Airway Findings: Mouth Opening: Normal Tongue: Normal  General Airway Assessment: Adult  Mallampati: II  TM Distance: Normal, at least 6 cm      Dental:  Dental Findings: upper partial dentures             Anesthesia Plan  Type of Anesthesia, risks & benefits discussed:  Anesthesia Type:  general  Patient's Preference:   Intra-op Monitoring Plan: standard ASA monitors  Intra-op Monitoring Plan Comments:   Post Op Pain Control Plan:   Post Op Pain Control Plan Comments:   Induction:   IV  Beta Blocker:  Patient is not currently on a Beta-Blocker (No further documentation required).       Informed Consent: Patient understands risks and agrees with Anesthesia plan.  Questions answered. Anesthesia consent signed with patient.  ASA Score: 3     Day of Surgery Review of History & Physical: I have interviewed and examined  the patient. I have reviewed the patient's H&P dated: 10/24/19. There are no significant changes.  H&P update referred to the surgeon.         Ready For Surgery From Anesthesia Perspective.

## 2019-10-22 DIAGNOSIS — R97.20 ELEVATED PSA: ICD-10-CM

## 2019-10-22 DIAGNOSIS — C61 PROSTATE CANCER: Primary | ICD-10-CM

## 2019-10-24 ENCOUNTER — HOSPITAL ENCOUNTER (OUTPATIENT)
Facility: HOSPITAL | Age: 69
Discharge: HOME | End: 2019-10-24
Attending: COLON & RECTAL SURGERY | Admitting: COLON & RECTAL SURGERY
Payer: MEDICARE

## 2019-10-24 ENCOUNTER — ANESTHESIA (OUTPATIENT)
Dept: ENDOSCOPY | Facility: HOSPITAL | Age: 69
End: 2019-10-24
Payer: MEDICARE

## 2019-10-24 VITALS
TEMPERATURE: 97 F | DIASTOLIC BLOOD PRESSURE: 72 MMHG | RESPIRATION RATE: 18 BRPM | HEART RATE: 71 BPM | SYSTOLIC BLOOD PRESSURE: 116 MMHG | OXYGEN SATURATION: 94 %

## 2019-10-24 DIAGNOSIS — Z86.010 HISTORY OF COLON POLYPS: ICD-10-CM

## 2019-10-24 DIAGNOSIS — Z12.11 SCREENING FOR COLON CANCER: Primary | ICD-10-CM

## 2019-10-24 PROCEDURE — 88305 TISSUE EXAM BY PATHOLOGIST: CPT | Performed by: PATHOLOGY

## 2019-10-24 PROCEDURE — 45380 COLONOSCOPY AND BIOPSY: CPT | Performed by: COLON & RECTAL SURGERY

## 2019-10-24 PROCEDURE — 88305 TISSUE SPECIMEN TO PATHOLOGY - SURGERY: ICD-10-PCS | Mod: 26,,, | Performed by: PATHOLOGY

## 2019-10-24 PROCEDURE — 27201089 HC SNARE, DISP (ANY): Performed by: COLON & RECTAL SURGERY

## 2019-10-24 PROCEDURE — 45385 COLONOSCOPY W/LESION REMOVAL: CPT | Mod: PT,,, | Performed by: COLON & RECTAL SURGERY

## 2019-10-24 PROCEDURE — 45385 PR COLONOSCOPY,REMV LESN,SNARE: ICD-10-PCS | Mod: PT,,, | Performed by: COLON & RECTAL SURGERY

## 2019-10-24 PROCEDURE — 45380 PR COLONOSCOPY,BIOPSY: ICD-10-PCS | Mod: 59,,, | Performed by: COLON & RECTAL SURGERY

## 2019-10-24 PROCEDURE — 63600175 PHARM REV CODE 636 W HCPCS: Performed by: NURSE ANESTHETIST, CERTIFIED REGISTERED

## 2019-10-24 PROCEDURE — 37000009 HC ANESTHESIA EA ADD 15 MINS: Performed by: COLON & RECTAL SURGERY

## 2019-10-24 PROCEDURE — 25000003 PHARM REV CODE 250: Performed by: NURSE ANESTHETIST, CERTIFIED REGISTERED

## 2019-10-24 PROCEDURE — 63600175 PHARM REV CODE 636 W HCPCS: Performed by: COLON & RECTAL SURGERY

## 2019-10-24 PROCEDURE — 37000008 HC ANESTHESIA 1ST 15 MINUTES: Performed by: COLON & RECTAL SURGERY

## 2019-10-24 PROCEDURE — 00811 ANES LWR INTST NDSC NOS: CPT | Mod: QZ,P3,PT | Performed by: NURSE ANESTHETIST, CERTIFIED REGISTERED

## 2019-10-24 PROCEDURE — 27201012 HC FORCEPS, HOT/COLD, DISP: Performed by: COLON & RECTAL SURGERY

## 2019-10-24 PROCEDURE — 45380 COLONOSCOPY AND BIOPSY: CPT | Mod: 59,,, | Performed by: COLON & RECTAL SURGERY

## 2019-10-24 PROCEDURE — 88305 TISSUE EXAM BY PATHOLOGIST: CPT | Mod: 26,,, | Performed by: PATHOLOGY

## 2019-10-24 PROCEDURE — 45385 COLONOSCOPY W/LESION REMOVAL: CPT | Performed by: COLON & RECTAL SURGERY

## 2019-10-24 RX ORDER — PROPOFOL 10 MG/ML
VIAL (ML) INTRAVENOUS
Status: DISCONTINUED | OUTPATIENT
Start: 2019-10-24 | End: 2019-10-24

## 2019-10-24 RX ORDER — SODIUM CHLORIDE, SODIUM LACTATE, POTASSIUM CHLORIDE, CALCIUM CHLORIDE 600; 310; 30; 20 MG/100ML; MG/100ML; MG/100ML; MG/100ML
INJECTION, SOLUTION INTRAVENOUS CONTINUOUS
Status: DISCONTINUED | OUTPATIENT
Start: 2019-10-24 | End: 2019-10-24 | Stop reason: HOSPADM

## 2019-10-24 RX ORDER — LIDOCAINE HYDROCHLORIDE 20 MG/ML
INJECTION, SOLUTION EPIDURAL; INFILTRATION; INTRACAUDAL; PERINEURAL
Status: DISCONTINUED | OUTPATIENT
Start: 2019-10-24 | End: 2019-10-24

## 2019-10-24 RX ADMIN — PROPOFOL 50 MG: 10 INJECTION, EMULSION INTRAVENOUS at 10:10

## 2019-10-24 RX ADMIN — PROPOFOL 20 MG: 10 INJECTION, EMULSION INTRAVENOUS at 10:10

## 2019-10-24 RX ADMIN — LIDOCAINE HYDROCHLORIDE 50 MG: 20 INJECTION, SOLUTION EPIDURAL; INFILTRATION; INTRACAUDAL; PERINEURAL at 10:10

## 2019-10-24 RX ADMIN — PROPOFOL 30 MG: 10 INJECTION, EMULSION INTRAVENOUS at 11:10

## 2019-10-24 RX ADMIN — PROPOFOL 120 MG: 10 INJECTION, EMULSION INTRAVENOUS at 10:10

## 2019-10-24 RX ADMIN — SODIUM CHLORIDE, SODIUM LACTATE, POTASSIUM CHLORIDE, AND CALCIUM CHLORIDE: .6; .31; .03; .02 INJECTION, SOLUTION INTRAVENOUS at 08:10

## 2019-10-24 NOTE — DISCHARGE SUMMARY
Discharge Note  Short Stay      SUMMARY     Admit Date: 10/24/2019    Attending Physician: Rylan Razo MD     Discharge Physician: Rylan Razo MD    Discharge Date: 10/24/2019 11:08 AM    Admission Diagnosis: personal history of colon polyps    Final Diagnosis:  Colon polyps, diverticulosis    Procedures Performed:    Colonoscopy polypectomy cold forceps and Colonoscopy polypectomy cold snare    Disposition: Home or Self Care    Condition at Discharge:  Stable    Patient Instructions:   Current Discharge Medication List      CONTINUE these medications which have NOT CHANGED    Details   aspirin (ECOTRIN) 81 MG EC tablet Take 81 mg by mouth once daily.      clotrimazole-betamethasone 1-0.05% (LOTRISONE) cream Apply to head of  Penis BId  Qty: 45 g, Refills: 1      tadalafil (CIALIS) 20 MG Tab Take 1 tablet (20 mg total) by mouth daily as needed.  Qty: 8 tablet, Refills: 5    Associated Diagnoses: Male erectile disorder      vitamin D 1000 units Tab Take 2,000 Units by mouth once daily.             Discharge Procedure Orders (must include Diet, Follow-up, Activity)   Discharge Procedure Orders (must include Diet, Follow-up, Activity)   Activity as tolerated        Repeat colonoscopy to be determined based on pathology.  High fiber diet

## 2019-10-24 NOTE — TRANSFER OF CARE
Anesthesia Transfer of Care Note    Patient: Awilda Rosenthal    Procedure(s) Performed: Procedure(s) (LRB):  COLONOSCOPY (N/A)    Patient location: PACU    Anesthesia Type: general    Transport from OR: Transported from OR on 6-10 L/min O2 by face mask with adequate spontaneous ventilation    Post pain: adequate analgesia    Post assessment: no apparent anesthetic complications and tolerated procedure well    Post vital signs: stable    Level of consciousness: sedated    Nausea/Vomiting: no nausea/vomiting    Complications: none    Transfer of care protocol was followed      Last vitals:   Visit Vitals  /87 (BP Location: Left arm, Patient Position: Lying)   Pulse 85   Temp 36.1 °C (97 °F)   Resp 18   SpO2 98%

## 2019-10-24 NOTE — H&P
Procedure : Colonoscopy    Indication(s):  personal history of colon polyps    Review of patient's allergies indicates:  No Known Allergies    Past Medical History:   Diagnosis Date    Prostate cancer        Prior to Admission medications    Medication Sig Start Date End Date Taking? Authorizing Provider   aspirin (ECOTRIN) 81 MG EC tablet Take 81 mg by mouth once daily.   Yes Historical Provider, MD   bisacodyl (DULCOLAX) 5 mg EC tablet Take 4 tablets (20 mg total) by mouth once per Dr. Razo's instruction sheet for 1 dose. 10/23/19 10/23/19  Rylan Razo MD   clotrimazole-betamethasone 1-0.05% (LOTRISONE) cream Apply to head of  Penis BId 4/25/19   Dustin Pete MD   sodium,potassium,mag sulfates (SUPREP BOWEL PREP KIT) 17.5-3.13-1.6 gram SolR Take 354 milliliters by mouth once per Dr. Razo's instruction sheet for 1 dose. 10/23/19 10/23/19  Rylan Razo MD   tadalafil (CIALIS) 20 MG Tab Take 1 tablet (20 mg total) by mouth daily as needed. 9/16/19   Dustin Pete MD   vitamin D 1000 units Tab Take 2,000 Units by mouth once daily.    Historical Provider, MD       Sedation Problems: NO    Family History   Problem Relation Age of Onset    Heart disease Father        Fam Hx of Sedation Problems: NO    Social History     Socioeconomic History    Marital status: Single     Spouse name: Not on file    Number of children: Not on file    Years of education: Not on file    Highest education level: Not on file   Occupational History     Employer: OTHER   Social Needs    Financial resource strain: Not on file    Food insecurity:     Worry: Not on file     Inability: Not on file    Transportation needs:     Medical: Not on file     Non-medical: Not on file   Tobacco Use    Smoking status: Never Smoker    Smokeless tobacco: Never Used   Substance and Sexual Activity    Alcohol use: No    Drug use: No    Sexual activity: Not on file   Lifestyle    Physical activity:     Days per week: Not on file      Minutes per session: Not on file    Stress: Not on file   Relationships    Social connections:     Talks on phone: Not on file     Gets together: Not on file     Attends Restorationism service: Not on file     Active member of club or organization: Not on file     Attends meetings of clubs or organizations: Not on file     Relationship status: Not on file   Other Topics Concern    Not on file   Social History Narrative    Not on file       Review of Systems -     Respiratory ROS: no cough, shortness of breath, or wheezing  Cardiovascular ROS: no chest pain or dyspnea on exertion  Gastrointestinal ROS: no abdominal pain, change in bowel habits, or black or bloody stools  Musculoskeletal ROS: negative  Neurological ROS: no TIA or stroke symptoms        Physical Exam:  General: no distress  Head: normocephalic  Airway:  normal oropharynx, airway normal  Neck: supple, symmetrical, trachea midline  Lungs:  normal respiratory effort  Heart: regular rate and rhythm  Abdomen: soft, non-tender non-distented; bowel sounds normal; no masses,  no organomegaly  Extremities: no cyanosis or edema, or clubbing       Deep Sedation: Mallampati Score per anesthesia     SedationPlan :Moderate     ASA : II    Patient is medically cleared for anesthesia.    Anesthesia/Surgery risks, benefits and alternative options discussed and understood by patient/family.

## 2019-10-24 NOTE — OP NOTE
Patient Name: Awilda Rosenthal   Procedure Date: 10/24/2019  MRN: [id number]  : 1950  Age: 68 y.o.  Gender: male   Referring Physician :  Dustin Pete MD  Plan for Procedure: Monitored anaesthesia care  Indication: personal history of colon polyps  Procedure:   Colonoscopy polypectomy cold forceps and Colonoscopy polypectomy cold snare    Surgeon(s) and Role:     * Rylan Razo MD - Primary    Complications: None     Medicines: monitored anesthesia care    Procedure:  Prior to the procedure, a History and Physical was performed, and patient medications, allergies and sensitivities were reviewed.  The patient's tolerance of previous anesthesia was reviewed. The risks and benefits of the procedure and the sedation options and risks were discussed with the patient.  All questions were answered and informed consent was obtained.    After I obtained informed consent, the scope was passed under direct vision.  Throughout the procedure, the patient's blood pressure, pulse, and oxygen saturations were monitored continuously.  The Olympus scope CF - [id number] was introduced through the anus and advanced to the cecum, identified by appendiceal orifice and ileocecal valve.  The colonoscopy was performed without difficulty.  The patient tolerated the procedure well.  The quality of the bowel preparation was good     Findings:  diverticulosis,  Moderate in degree, involving the sigmoid  polyp(s) #1, 8 mm in size, located in the ascending colon removed by cold snare and retrieved for pathology  #2, 8 mm in size, located in the transverse colon removed by cold snare and retrieved for pathology  #3, 6 mm in size, located in the transverse colon removed by cold snare and retrieved for pathology  #4, 4 mm in size, located in the transverse colon removed by cold biopsy and sent for pathology    EBL: none    Impression:  Four benign appearing colon polyp, removed as above.  Moderate colonic diverticulosis involving  the  sigmoid    Recommendation:  Repeat exam:  To be determined by pathology  Return to my office prn  High fiber diet

## 2019-10-24 NOTE — ANESTHESIA POSTPROCEDURE EVALUATION
Anesthesia Post Evaluation    Patient: Awilda Rosenthal    Procedure(s) Performed: Procedure(s) (LRB):  COLONOSCOPY (N/A)    Final Anesthesia Type: general  Patient location during evaluation: PACU  Patient participation: Yes- Able to Participate  Level of consciousness: awake and alert and oriented  Post-procedure vital signs: reviewed and stable  Pain management: adequate  Airway patency: patent  PONV status at discharge: No PONV  Anesthetic complications: no      Cardiovascular status: blood pressure returned to baseline and hemodynamically stable  Respiratory status: unassisted, spontaneous ventilation and room air  Hydration status: euvolemic  Follow-up not needed.          Vitals Value Taken Time   /72 10/24/2019 11:27 AM   Temp 36.1 °C (97 °F) 10/24/2019 11:06 AM   Pulse 71 10/24/2019 11:27 AM   Resp 18 10/24/2019 11:27 AM   SpO2 94 % 10/24/2019 11:27 AM         Event Time     Out of Recovery 11:36:13          Pain/Sabra Score: Sabra Score: 10 (10/24/2019 11:06 AM)

## 2019-10-30 ENCOUNTER — HOSPITAL ENCOUNTER (OUTPATIENT)
Facility: HOSPITAL | Age: 69
Discharge: HOME | End: 2019-10-30
Attending: UROLOGY | Admitting: UROLOGY
Payer: MEDICARE

## 2019-10-30 VITALS
HEART RATE: 81 BPM | RESPIRATION RATE: 18 BRPM | OXYGEN SATURATION: 83 % | DIASTOLIC BLOOD PRESSURE: 60 MMHG | SYSTOLIC BLOOD PRESSURE: 109 MMHG | TEMPERATURE: 97 F

## 2019-10-30 DIAGNOSIS — R97.20 ELEVATED PSA: Primary | ICD-10-CM

## 2019-10-30 LAB
BACTERIA #/AREA URNS HPF: ABNORMAL /HPF
BILIRUB UR QL STRIP: NEGATIVE
CLARITY UR: CLEAR
COLOR UR: YELLOW
GLUCOSE UR QL STRIP: NEGATIVE
HGB UR QL STRIP: NEGATIVE
KETONES UR QL STRIP: NEGATIVE
LEUKOCYTE ESTERASE UR QL STRIP: ABNORMAL
MICROSCOPIC COMMENT: ABNORMAL
NITRITE UR QL STRIP: NEGATIVE
PH UR STRIP: 6 [PH] (ref 5–8)
PROT UR QL STRIP: NEGATIVE
RBC #/AREA URNS HPF: 0 /HPF (ref 0–4)
SP GR UR STRIP: 1.02 (ref 1–1.03)
URN SPEC COLLECT METH UR: ABNORMAL
UROBILINOGEN UR STRIP-ACNC: NEGATIVE EU/DL
WBC #/AREA URNS HPF: 10 /HPF (ref 0–5)

## 2019-10-30 PROCEDURE — 76872 US TRANSRECTAL: CPT | Mod: 26,,, | Performed by: UROLOGY

## 2019-10-30 PROCEDURE — 63600175 PHARM REV CODE 636 W HCPCS: Performed by: UROLOGY

## 2019-10-30 PROCEDURE — 88305 TISSUE EXAM BY PATHOLOGIST: CPT | Mod: 59 | Performed by: PATHOLOGY

## 2019-10-30 PROCEDURE — 76872 PR US TRANSRECTAL: ICD-10-PCS | Mod: 26,,, | Performed by: UROLOGY

## 2019-10-30 PROCEDURE — 36000704 HC OR TIME LEV I 1ST 15 MIN: Performed by: UROLOGY

## 2019-10-30 PROCEDURE — 88305 TISSUE EXAM BY PATHOLOGIST: CPT | Mod: 26,,, | Performed by: PATHOLOGY

## 2019-10-30 PROCEDURE — 81000 URINALYSIS NONAUTO W/SCOPE: CPT

## 2019-10-30 PROCEDURE — 76942 ECHO GUIDE FOR BIOPSY: CPT | Mod: 26,59,, | Performed by: UROLOGY

## 2019-10-30 PROCEDURE — 55700 PR BIOPSY OF PROSTATE,NEEDLE/PUNCH: CPT | Mod: ,,, | Performed by: UROLOGY

## 2019-10-30 PROCEDURE — 55700 PR BIOPSY OF PROSTATE,NEEDLE/PUNCH: ICD-10-PCS | Mod: ,,, | Performed by: UROLOGY

## 2019-10-30 PROCEDURE — 36000705 HC OR TIME LEV I EA ADD 15 MIN: Performed by: UROLOGY

## 2019-10-30 PROCEDURE — 76942 PR U/S GUIDANCE FOR NEEDLE GUIDANCE: ICD-10-PCS | Mod: 26,59,, | Performed by: UROLOGY

## 2019-10-30 PROCEDURE — 88305 TISSUE SPECIMEN TO PATHOLOGY, UROLOGY: ICD-10-PCS | Mod: 26,,, | Performed by: PATHOLOGY

## 2019-10-30 RX ORDER — FENTANYL CITRATE 50 UG/ML
INJECTION, SOLUTION INTRAMUSCULAR; INTRAVENOUS
Status: DISCONTINUED | OUTPATIENT
Start: 2019-10-30 | End: 2019-10-30 | Stop reason: HOSPADM

## 2019-10-30 RX ORDER — LIDOCAINE HYDROCHLORIDE 10 MG/ML
10 INJECTION INFILTRATION; PERINEURAL ONCE
Status: CANCELLED | OUTPATIENT
Start: 2019-10-30 | End: 2019-10-30

## 2019-10-30 RX ORDER — MIDAZOLAM HYDROCHLORIDE 5 MG/ML
INJECTION INTRAMUSCULAR; INTRAVENOUS
Status: DISCONTINUED | OUTPATIENT
Start: 2019-10-30 | End: 2019-10-30 | Stop reason: HOSPADM

## 2019-10-30 RX ORDER — LIDOCAINE HYDROCHLORIDE 20 MG/ML
JELLY TOPICAL ONCE
Status: CANCELLED | OUTPATIENT
Start: 2019-10-30 | End: 2019-10-30

## 2019-10-30 NOTE — INTERVAL H&P NOTE
The patient has been examined and the H&P has been reviewed:        I concur with the findings and no changes have occurred since H&P was written.        Patient cleared for Anesthesia: Conscious Sedation        Anesthesia/Surgery risks, benefits and alternative options discussed and understood by patient/family.      Active Hospital Problems    Diagnosis  POA    Elevated PSA [R97.20]  Yes      Resolved Hospital Problems   No resolved problems to display.

## 2019-10-30 NOTE — OP NOTE
Preoperative diagnosis elevated PSA  Postop diagnosis same  Operation performed truss with biopsy  Surgeon Tisha  Number biopsies 12  Estimated blood loss minimal    Patient was placed in left lateral decubitus position. He was given intravenous sedation per RN.  No hypo or hyperechoic lesions were noted.  The prostate measured 4.9 x 5.7 x 6.2 cm for 92 g. no obvious lesions were noted.  Multiple stones were seen.  Twelve random sextant biopsies were performed.  Patient will continue taking his antibiotics and was discharged in satisfactory condition with stable vital signs.  He will be called next week with the results of the biopsy.  If he has temperature of a 101° or greater he will call me  Bashir Giles MD

## 2019-10-30 NOTE — DISCHARGE SUMMARY
[facility]  Brief Operative Note     SUMMARY     Surgery Date: 10/30/2019     Surgeon(s) and Role:     * Bashir Giles Jr., MD - Primary    Assisting Surgeon: None    Pre-op Diagnosis:  Elevated PSA [R97.20]    Post-op Diagnosis:  Post-Op Diagnosis Codes:     * Elevated PSA [R97.20]    Procedure(s) (LRB):  BIOPSY-TRUS (N/A)    Anesthesia: Local    Description of the findings of the procedure:  See Op Note    Findings/Key Components: 92 gm      Estimated Blood Loss: * No values recorded between 10/30/2019  7:38 AM and 10/30/2019  7:57 AM *         Specimens:   Specimen (12h ago, onward)    None          Discharge Note    SUMMARY     Admit Date: 10/30/2019    Discharge Date and Time: 10/30/2019 8:01 AM    Final Diagnosis: Post-Op Diagnosis Codes:     * Elevated PSA [R97.20]    Disposition: Home or Self Care    Follow Up/Patient Instructions:  Please contact office if any problems arise.       Medications:    Reconciled Home Medications:      Medication List      ASK your doctor about these medications    aspirin 81 MG EC tablet  Commonly known as:  ECOTRIN  Take 81 mg by mouth once daily.     clotrimazole-betamethasone 1-0.05% cream  Commonly known as:  LOTRISONE  Apply to head of  Penis BId     tadalafil 20 MG Tab  Commonly known as:  CIALIS  Take 1 tablet (20 mg total) by mouth daily as needed.     vitamin D 1000 units Tab  Commonly known as:  VITAMIN D3  Take 2,000 Units by mouth once daily.          Discharge Procedure Orders   Tissue Specimen To Pathology, Urology   Order Comments: 1. Left apex  2. Left middle  3. Left base  4. Right apex  5. Right middle  6. Right base     Order Specific Question Answer Comments   Directional Terms: N/A    Clinical Information: TRUS    Specific Site: Prostate

## 2019-10-30 NOTE — DISCHARGE INSTRUCTIONS
Prostate Needle Biopsy    Prostate needle biopsy is a test to look for prostate cancer. During the test, a thin, hollow needle is used to take small samples of tissue from the prostate. The samples are then tested in a lab.  Getting ready for the procedure  Prepare as you have been told. In addition to the following:  · Tell your healthcare provider about all medicines you take. This includes herbs and other supplements. It also includes any blood thinners, such as warfarin, clopidogrel, or daily aspirin. You may need to stop taking some or all of them before the procedure.  · You may be told to use a laxative, enemas, or both before the biopsy. This is to empty the colon and rectum of stool. Follow the instructions you are given.  · Your healthcare provider may prescribe antibiotics before the procedure. If so, take these as directed.  Risks and possible complications   All procedures have risks. The risks of this procedure include:  · Discomfort in the prostate area  · Infection in the urinary tract or prostate  · Infection in the bloodstream  · Rectal or urinary bleeding   The day of the procedure  The procedure is done in a healthcare providers office or a hospital. It takes about 45 minutes. You will be able to go home the same day. Transrectal ultrasound is often used during the procedure. This test uses sound waves to make images on a computer screen. The images help the healthcare provider insert the needle in the correct place. During the biopsy:  · If ultrasound will be used, you may be asked to drink water to fill your bladder.    · You may lie on your side on an exam table.   · The ultrasound transducer, which is about the size of a finger, is lubricated. It is then inserted into the rectum. This will feel like a prostate exam. The transducer is moved until the prostate can be seen in the ultrasound images.    · To numb the biopsy area, local anesthetics may be injected. You might also be given  medicine to make you sleepy.  · Using the ultrasound images as a guide, the biopsy needle is inserted. It may be inserted through the rectum or through the skin between the scrotum and the anus (perineum).  · The needle is used to take tissue samples from the prostate. About 12 samples are taken from different areas of the prostate. These samples are sent to a lab to be tested for cancer.  After the biopsy  At first you may feel a little lightheaded, especially if you had medicine to make you sleepy. You can lie on the table until you feel able to stand. You can go home once you are feeling better. You can go back to your normal activities. You may have some blood in your urine or stool that day. This is normal. You may also notice blood in your semen for weeks after the biopsy. This is normal and not dangerous. Your healthcare provider can tell you more about what to expect.  Follow-up care  You will see your healthcare provider for a follow-up visit. Depending on the biopsy results, you may be scheduled for more tests. If signs of cancer are found, you and your healthcare provider can discuss options for further testing.  When to call your healthcare provider  Call your healthcare provider if you have any of the following:  · Blood clots in your urine  · Bloody diarrhea  · Blood in the urine or stool that doesnt go away after 48 hours  · Chest pain or trouble breathing (call 911)  · Chills  · Feeling of weakness  · Fever of 100.4°F (38°C) or higher, or as directed by your healthcare provider  · Inability to urinate   Date Last Reviewed: 5/1/2017 © 2000-2017 [employer]. [address] [place] [address], [place] [address]. All rights reserved. This information is not intended as a substitute for professional medical care. Always follow your healthcare professional's instructions.

## 2019-11-13 ENCOUNTER — TELEPHONE (OUTPATIENT)
Dept: FAMILY MEDICINE | Facility: CLINIC | Age: 69
End: 2019-11-13

## 2019-11-13 ENCOUNTER — HOSPITAL ENCOUNTER (EMERGENCY)
Facility: HOSPITAL | Age: 69
Discharge: HOME | End: 2019-11-13
Attending: SURGERY
Payer: MEDICARE

## 2019-11-13 VITALS
SYSTOLIC BLOOD PRESSURE: 167 MMHG | HEART RATE: 99 BPM | TEMPERATURE: 97 F | WEIGHT: 262.81 LBS | OXYGEN SATURATION: 98 % | RESPIRATION RATE: 20 BRPM | HEIGHT: 72 IN | BODY MASS INDEX: 35.6 KG/M2 | DIASTOLIC BLOOD PRESSURE: 78 MMHG

## 2019-11-13 DIAGNOSIS — K62.89 RECTAL PAIN: Primary | ICD-10-CM

## 2019-11-13 PROCEDURE — 96372 THER/PROPH/DIAG INJ SC/IM: CPT

## 2019-11-13 PROCEDURE — 63600175 PHARM REV CODE 636 W HCPCS: Performed by: SURGERY

## 2019-11-13 PROCEDURE — 99284 EMERGENCY DEPT VISIT MOD MDM: CPT | Mod: 25

## 2019-11-13 RX ORDER — HYDROCODONE BITARTRATE AND ACETAMINOPHEN 5; 325 MG/1; MG/1
1 TABLET ORAL EVERY 4 HOURS PRN
Qty: 6 TABLET | Refills: 0 | Status: SHIPPED | OUTPATIENT
Start: 2019-11-13 | End: 2019-11-15

## 2019-11-13 RX ORDER — HYDROCORTISONE 25 MG/G
CREAM TOPICAL 2 TIMES DAILY
Qty: 1 TUBE | Refills: 0 | Status: SHIPPED | OUTPATIENT
Start: 2019-11-13 | End: 2020-09-04

## 2019-11-13 RX ORDER — DOCUSATE SODIUM 100 MG/1
100 CAPSULE, LIQUID FILLED ORAL 2 TIMES DAILY PRN
Qty: 30 CAPSULE | Refills: 0 | Status: SHIPPED | OUTPATIENT
Start: 2019-11-13 | End: 2020-09-04

## 2019-11-13 RX ORDER — HYDROMORPHONE HYDROCHLORIDE 1 MG/ML
2 INJECTION, SOLUTION INTRAMUSCULAR; INTRAVENOUS; SUBCUTANEOUS
Status: DISCONTINUED | OUTPATIENT
Start: 2019-11-13 | End: 2019-11-13

## 2019-11-13 RX ORDER — METRONIDAZOLE 500 MG/1
500 TABLET ORAL 4 TIMES DAILY
Qty: 28 TABLET | Refills: 0 | Status: SHIPPED | OUTPATIENT
Start: 2019-11-13 | End: 2019-11-20

## 2019-11-13 RX ORDER — ONDANSETRON 2 MG/ML
4 INJECTION INTRAMUSCULAR; INTRAVENOUS
Status: COMPLETED | OUTPATIENT
Start: 2019-11-13 | End: 2019-11-13

## 2019-11-13 RX ORDER — HYDROMORPHONE HYDROCHLORIDE 1 MG/ML
1 INJECTION, SOLUTION INTRAMUSCULAR; INTRAVENOUS; SUBCUTANEOUS
Status: COMPLETED | OUTPATIENT
Start: 2019-11-13 | End: 2019-11-13

## 2019-11-13 RX ADMIN — ONDANSETRON 4 MG: 2 INJECTION INTRAMUSCULAR; INTRAVENOUS at 07:11

## 2019-11-13 RX ADMIN — HYDROMORPHONE HYDROCHLORIDE 1 MG: 1 INJECTION, SOLUTION INTRAMUSCULAR; INTRAVENOUS; SUBCUTANEOUS at 07:11

## 2019-11-13 NOTE — TELEPHONE ENCOUNTER
----- Message from Inocente Still sent at 2019 11:45 AM CST -----  Contact: VIRAJ Concepcion Jenna Rosenthal  MRN: [id number]  : 1950  PCP: Dustin Pete  Home Phone      [phone]  Work Phone      Not on file.  Mobile          [phone]      MESSAGE: had colonoscopy done on 10/20/19 -- then 6 days later prostate biopsy -- states he is still having pain & burning -- requesting appt with Dr Pete    Call Jenna @ [phone]    PCP: Juan R

## 2019-11-14 ENCOUNTER — TELEPHONE (OUTPATIENT)
Dept: SURGERY | Facility: CLINIC | Age: 69
End: 2019-11-14

## 2019-11-14 NOTE — TELEPHONE ENCOUNTER
----- Message from Rylan Razo MD sent at 11/14/2019  7:05 AM CST -----  I can see him next Monday at [facility] or next Thursday at [facility].    ----- Message -----  From: Mike Esquivel MD  Sent: 11/13/2019   7:44 PM CST  To: MD Dr. Danni Shepherd    This patient has had continued rectal pain since colonoscopy and October 2019  Digital rectal exam showed several small external polyps, possible anal fissure  Patient has severe pain with any bowel movement, he would like to see you in clinic  He was hoping to see you in clinic tomorrow if possible, I have attached is note    If there is anything I can do to help, please let me know  Mike Esquivel M.D. 7:44 PM 11/13/2019

## 2019-11-14 NOTE — ED PROVIDER NOTES
[facility]                                                 Chief Complaint  69 y.o. male with Rectal Pain    History of Present Illness  Awilda Rosenthal presents to the emergency room with rectal pain for months  Patient had a colonoscopy and polypectomy in late October 2019 per history  Has had constipation and pain with bowel movement since that colonoscopy  Patient has a soft abdomen with no signs of peritonitis or rebound noted now  Digital rectal exam shows several small external hemorrhoid, no bleeding now    The history is provided by the patient   device was not used during this ER visit  Medical history: Prostate cancer  Surgeries: Appendectomy, TURP, colonoscopy  No known allergy    I have reviewed all of this patient's past medical, surgical, family, and social   histories as well as active allergies and medications documented in the  electronic medical record    Review of Systems and Physical Exam      Review of Systems  -- Constitution - no fever, denies fatigue, no weakness, no chills  -- Eyes - no tearing or redness, no visual disturbance  -- Ear, Nose - no tinnitus or earache, no nasal congestion or discharge  -- Mouth,Throat - no sore throat, no toothache, normal voice, normal swallowing  -- Respiratory - denies cough and congestion, no shortness of breath, no SALINAS  -- Cardiovascular - denies chest pain, no palpitations, denies claudication  -- Gastrointestinal - rectal pain for last 2 months, worse the last 2 weeks  -- Genitourinary - no dysuria, no hematuria, no flank pain, no bladder pain  -- Musculoskeletal - denies back pain, negative for trauma or injury  -- Neurological - no headache, denies weakness or seizure; no LOC  -- Skin - denies pallor, rash, or changes in skin. no hives or welts noted    Vital Signs  His temperature is 97 °F (36.1 °C).   His blood pressure is 167/78 and his pulse is 99.   His respiration is 20 and oxygen saturation is 98%.      Physical Exam  -- Nursing note and vitals reviewed  -- Constitutional: Appears well-developed and well-nourished  -- Head: Atraumatic. Normocephalic. No obvious abnormality  -- Eyes: Pupils are equal and reactive to light. Normal conjunctiva and lids  -- Cardiac: Normal rate, regular rhythm and normal heart sounds  -- Pulmonary: Normal respiratory effort, breath sounds clear to auscultation  -- Abdominal: Soft, no tenderness. Normal bowel sounds. Normal liver edge  -- Rectal: Several small external hemorrhoids with narrowing of the rectal canal on JIMBO  -- Musculoskeletal: Normal range of motion, no effusions. Joints stable   -- Neurological: No focal deficits. Showed good interaction with staff  -- Skin: Warm and dry. No evidence of rash or cellulitis    Emergency Room Course      Medications Given  ondansetron injection 4 mg (4 mg Intramuscular Given 11/13/19 1933)   HYDROmorphone injection 1 mg (1 mg Intramuscular Given 11/13/19 1934)     ED Management  -- patient with continued rectal/anal pain since colonoscopy/polypectomy  -- digital rectal exam shows several small external hemorrhoid with fissure  -- patient states that hurts that have a bowel movement for last 2 months now  -- stool softener and pain medicine prescribe, Anusol for hemorrhoids  -- patient will be seen in the Colorectal Clinic locally tomorrow afternoon  -- I will contact his colorectal surgeon via [employer] for appropriate follow-up    Diagnosis  -- The encounter diagnosis was Rectal pain.    Disposition and Plan  -- Disposition: home  -- Condition: stable  -- Follow-up: Patient to follow up with colorectal MD tomorrow morning  -- I advised the patient that we have found no life threatening condition today  -- At this time, I believe the patient is clinically stable for discharge.   -- The patient acknowledges that close follow up with a MD is required   -- Patient agrees to comply with all instruction and direction given in the ER    This note  is dictated on M*Modal word recognition program.  There are word recognition mistakes that are occasionally missed on review.          Mike Esquivel MD  11/13/19 1943

## 2019-11-14 NOTE — TELEPHONE ENCOUNTER
Spoke with patient in regards to a possible fissure and hemorrhoid.  Instructed patient to drink 8 glasses of water a day, take miralax to soften stools.  Appointment scheduled with Dr. Razo at [place] 11/21.

## 2019-11-19 ENCOUNTER — PATIENT OUTREACH (OUTPATIENT)
Dept: ADMINISTRATIVE | Facility: OTHER | Age: 69
End: 2019-11-19

## 2019-11-21 ENCOUNTER — OFFICE VISIT (OUTPATIENT)
Dept: SURGERY | Facility: CLINIC | Age: 69
End: 2019-11-21
Payer: MEDICARE

## 2019-11-21 VITALS
RESPIRATION RATE: 16 BRPM | WEIGHT: 262.13 LBS | DIASTOLIC BLOOD PRESSURE: 76 MMHG | HEIGHT: 72 IN | SYSTOLIC BLOOD PRESSURE: 138 MMHG | BODY MASS INDEX: 35.5 KG/M2

## 2019-11-21 DIAGNOSIS — Z86.010 HISTORY OF COLON POLYPS: ICD-10-CM

## 2019-11-21 DIAGNOSIS — K60.2 ANAL FISSURE: Primary | ICD-10-CM

## 2019-11-21 DIAGNOSIS — K64.4 EXTERNAL HEMORRHOIDS: ICD-10-CM

## 2019-11-21 PROCEDURE — 99999 PR PBB SHADOW E&M-EST. PATIENT-LVL III: CPT | Mod: PBBFAC,,, | Performed by: COLON & RECTAL SURGERY

## 2019-11-21 PROCEDURE — 99999 PR STA SHADOW: CPT | Mod: PBBFAC,,, | Performed by: COLON & RECTAL SURGERY

## 2019-11-21 PROCEDURE — 99213 OFFICE O/P EST LOW 20 MIN: CPT | Mod: PBBFAC | Performed by: COLON & RECTAL SURGERY

## 2019-11-21 PROCEDURE — 99213 OFFICE O/P EST LOW 20 MIN: CPT | Mod: S$PBB | Performed by: COLON & RECTAL SURGERY

## 2019-11-21 PROCEDURE — 99999 PR STA SHADOW: ICD-10-PCS | Mod: PBBFAC,,, | Performed by: COLON & RECTAL SURGERY

## 2019-11-21 RX ORDER — SULFAMETHOXAZOLE AND TRIMETHOPRIM 800; 160 MG/1; MG/1
TABLET ORAL
Refills: 0 | COMMUNITY
Start: 2019-10-16 | End: 2020-09-04

## 2019-11-21 NOTE — PROGRESS NOTES
Subjective:       Patient ID: Awilda Rosenthal is a 69 y.o. male.    Chief Complaint: Follow-up (pt recently seen in ed)  70 yo M on whom I performed a screening colonoscopy in November 2018 - he had 9 polyps removed ranging in size from 4-20 mm - all but 2 of them were adenomas on pathology. He underwent a follow-up colonoscopy on 10/24/2019, at which point four additional polyps were removed, ranging in size from 4-8 mm.  Only 1 of these polyps was an adenoma.    He then underwent a TRUS with prostate biopsies by Dr Giles 10/30/19 for follow-up of elevated PSA.  He states that he developed severe constipation afterwards.    Following this, he developed severe anal pain, particularly with bowel movements.  He describes the pain as sharp tearing sensation with bowel movements followed by dull aching for several hours afterwards.  He was then seen in the ER on 11/13/2019.  He was prescribed Anusol and stool softeners.    He comes in today for further evaluation.  His constipation has resolved, but he still has significant pain with his bowel movements.  No bleeding.      No known family hx of CRC or IBD.    Review of patient's allergies indicates:  No Known Allergies    Past Medical History:   Diagnosis Date    Prostate cancer        Past Surgical History:   Procedure Laterality Date    APPENDECTOMY      BIOPSY WITH TRANSRECTAL ULTRASOUND (TRUS) GUIDANCE N/A 10/17/2018    Procedure: TRANSRECTAL PROSTATE ULTRASOUND-GUIDED NEEDLE BIOPSY;  Surgeon: Bashir Giles Jr., MD;  Location: [facility];  Service: Urology;  Laterality: N/A;    BIOPSY WITH TRANSRECTAL ULTRASOUND (TRUS) GUIDANCE Bilateral 10/30/2019    Procedure: BIOPSY-TRUS;  Surgeon: Bashir Giles Jr., MD;  Location: [facility];  Service: Urology;  Laterality: Bilateral;    COLONOSCOPY N/A 11/1/2018    Procedure: COLONOSCOPY;  Surgeon: Rylan Razo MD;  Location: [facility];  Service: Colon and Rectal;  Laterality: N/A;    COLONOSCOPY N/A 10/24/2019     Procedure: COLONOSCOPY;  Surgeon: Rylan Razo MD;  Location: [facility];  Service: Colon and Rectal;  Laterality: N/A;    kidney stones         Current Outpatient Medications   Medication Sig Dispense Refill    aspirin (ECOTRIN) 81 MG EC tablet Take 81 mg by mouth once daily.      clotrimazole-betamethasone 1-0.05% (LOTRISONE) cream Apply to head of  Penis BId 45 g 1    docusate sodium (COLACE) 100 MG capsule Take 1 capsule (100 mg total) by mouth 2 (two) times daily as needed for Constipation. 30 capsule 0    hydrocortisone 2.5 % cream Apply topically 2 (two) times daily. 1 Tube 0    tadalafil (CIALIS) 20 MG Tab Take 1 tablet (20 mg total) by mouth daily as needed. 8 tablet 5    vitamin D 1000 units Tab Take 2,000 Units by mouth once daily.      diltiazem HCl (DILTIAZEM 2% CREAM) Apply topically 3 (three) times daily. Apply topically to anal area. 50 g 5    sulfamethoxazole-trimethoprim 800-160mg (BACTRIM DS) 800-160 mg Tab TAKE ONE TABLET BY MOUTH EVERY TWELVE HOURS STARTING THE DAY BEFORE prostate biopsy FOR 4 doses ONLY  0     No current facility-administered medications for this visit.        Family History   Problem Relation Age of Onset    Heart disease Father        Social History     Socioeconomic History    Marital status: Single     Spouse name: Not on file    Number of children: Not on file    Years of education: Not on file    Highest education level: Not on file   Occupational History     Employer: OTHER   Social Needs    Financial resource strain: Not on file    Food insecurity:     Worry: Not on file     Inability: Not on file    Transportation needs:     Medical: Not on file     Non-medical: Not on file   Tobacco Use    Smoking status: Never Smoker    Smokeless tobacco: Never Used   Substance and Sexual Activity    Alcohol use: No    Drug use: No    Sexual activity: Not on file   Lifestyle    Physical activity:     Days per week: Not on file     Minutes per session: Not on  file    Stress: Not on file   Relationships    Social connections:     Talks on phone: Not on file     Gets together: Not on file     Attends Taoism service: Not on file     Active member of club or organization: Not on file     Attends meetings of clubs or organizations: Not on file     Relationship status: Not on file   Other Topics Concern    Not on file   Social History Narrative    Not on file       Review of Systems   Constitutional: Negative for chills and fever.   HENT: Negative for congestion and sinus pressure.    Eyes: Negative for visual disturbance.   Respiratory: Negative for cough and shortness of breath.    Cardiovascular: Negative for chest pain and palpitations.   Gastrointestinal: Positive for rectal pain. Negative for abdominal distention, abdominal pain, anal bleeding, blood in stool, constipation, diarrhea, nausea and vomiting.   Endocrine: Negative for cold intolerance and heat intolerance.   Genitourinary: Positive for frequency. Negative for dysuria.   Musculoskeletal: Negative for arthralgias and back pain.   Skin: Negative for rash.   Allergic/Immunologic: Negative for immunocompromised state.   Neurological: Negative for dizziness, light-headedness and headaches.   Psychiatric/Behavioral: Negative for confusion. The patient is not nervous/anxious.        Objective:      Physical Exam   Constitutional: He is oriented to person, place, and time. He appears well-developed and well-nourished.   HENT:   Head: Normocephalic and atraumatic.   Eyes: Conjunctivae are normal.   Pulmonary/Chest: Effort normal. No respiratory distress.   Abdominal: Soft. He exhibits no distension and no mass. There is no tenderness. There is no rebound and no guarding.   Genitourinary:   Genitourinary Comments: Perineum - normal perianal skin, no mass, + deep, chronic appearing posterior midline fissure, + non thrombosed external hemorrhoid RL position  JIMBO/Anoscopy - not performed due to the fissure      Neurological: He is alert and oriented to person, place, and time.   Skin: Skin is warm and dry. No erythema.         Lab Results   Component Value Date    WBC 5.54 06/29/2018    HGB 11.5 (L) 06/29/2018    HCT 38.7 (L) 06/29/2018    MCV 76 (L) 06/29/2018     (H) 06/29/2018     BMP  Lab Results   Component Value Date     06/29/2018    K 4.4 06/29/2018     06/29/2018    CO2 24 06/29/2018    BUN 15 06/29/2018    CREATININE 1.0 06/29/2018    CALCIUM 9.1 06/29/2018    ANIONGAP 7 (L) 06/29/2018    ESTGFRAFRICA >60 06/29/2018    EGFRNONAA >60 06/29/2018     CMP  Sodium   Date Value Ref Range Status   06/29/2018 138 136 - 145 mmol/L Final     Potassium   Date Value Ref Range Status   06/29/2018 4.4 3.5 - 5.1 mmol/L Final     Chloride   Date Value Ref Range Status   06/29/2018 107 95 - 110 mmol/L Final     CO2   Date Value Ref Range Status   06/29/2018 24 23 - 29 mmol/L Final     Glucose   Date Value Ref Range Status   06/29/2018 95 70 - 110 mg/dL Final     BUN, Bld   Date Value Ref Range Status   06/29/2018 15 8 - 23 mg/dL Final     Creatinine   Date Value Ref Range Status   06/29/2018 1.0 0.5 - 1.4 mg/dL Final     Calcium   Date Value Ref Range Status   06/29/2018 9.1 8.7 - 10.5 mg/dL Final     Total Protein   Date Value Ref Range Status   06/29/2018 7.0 6.0 - 8.4 g/dL Final     Albumin   Date Value Ref Range Status   06/29/2018 3.6 3.5 - 5.2 g/dL Final     Total Bilirubin   Date Value Ref Range Status   06/29/2018 0.5 0.1 - 1.0 mg/dL Final     Comment:     For infants and newborns, interpretation of results should be based  on gestational age, weight and in agreement with clinical  observations.  Premature Infant recommended reference ranges:  Up to 24 hours.............<8.0 mg/dL  Up to 48 hours............<12.0 mg/dL  3-5 days..................<15.0 mg/dL  6-29 days.................<15.0 mg/dL       Alkaline Phosphatase   Date Value Ref Range Status   06/29/2018 95 55 - 135 U/L Final     AST   Date  Value Ref Range Status   06/29/2018 16 10 - 40 U/L Final     ALT   Date Value Ref Range Status   06/29/2018 13 10 - 44 U/L Final     Anion Gap   Date Value Ref Range Status   06/29/2018 7 (L) 8 - 16 mmol/L Final     eGFR if    Date Value Ref Range Status   06/29/2018 >60 >60 mL/min/1.73 m^2 Final     eGFR if non    Date Value Ref Range Status   06/29/2018 >60 >60 mL/min/1.73 m^2 Final     Comment:     Calculation used to obtain the estimated glomerular filtration  rate (eGFR) is the CKD-EPI equation.        No results found for: CEA        Assessment:       1. Anal fissure    2. External hemorrhoids    3. History of colon polyps        Plan:   Increased fiber intake (20-25 grams/day) and fluid intake (8-10 glasses water/day)  Daily fiber supplement  Soaks/sitz baths  Avoid excessive trauma/straining if possible  Topical diltiazem 2% tid.  RTO 6 weeks - if no improvement, will consider LIAS.      Rylan Razo MD, FACS, FASCRS  Senior Staff Surgeon  Department of Colon & Rectal Surgery

## 2019-11-21 NOTE — PATIENT INSTRUCTIONS
Increased fiber intake (20-25 grams/day) and fluid intake (8-10 glasses water/day)  Daily fiber supplement  Soaks/sitz baths  Avoid excessive trauma/straining if possible  Topical diltiazem 2% 3x/day

## 2019-11-21 NOTE — LETTER
December 2, 2019      Dustin Pete MD  [address]  [facility] [address]           [place] - Colon/Rectal Surg  [address] [facility] [address]  Phone: [phone]          Patient: Awilda Rosenthal   MR Number: [id number]   YOB: 1950   Date of Visit: 11/21/2019       Dear Dr. Pete:    Thank you for referring Awilda Rosenthal to me for evaluation. Attached you will find relevant portions of my assessment and plan of care.    If you have questions, please do not hesitate to call me. I look forward to following Awilda Rosenthal along with you.    Sincerely,    Rylan Razo MD    Enclosure  CC:  Bashir Giles Jr., MD    If you would like to receive this communication electronically, please contact [email or web address] or [phone] to request more information on [employer] access.    For providers and/or their staff who would like to refer a patient to [facility], please contact us through our one-stop-shop provider referral line, [facility], [place], at [phone].    If you feel you have received this communication in error or would no longer like to receive these types of communications, please e-mail [email or web address]

## 2019-12-17 ENCOUNTER — TELEPHONE (OUTPATIENT)
Dept: FAMILY MEDICINE | Facility: CLINIC | Age: 69
End: 2019-12-17

## 2019-12-17 NOTE — TELEPHONE ENCOUNTER
----- Message from Brandie Chaparro sent at 2019  2:02 PM CST -----  Contact: wife- suzi Rosenthal  MRN: [id number]  : 1950  PCP: Dustin Pete  Home Phone      [phone]  Work Phone      Not on file.  Mobile          [phone]      MESSAGE:   Pt requests a sooner appointment than the  can schedule.  Does patient feel like they need to be seen today:  Yes  What is the nature of the appointment:  Pain in rectum   What visit type:  est  Did you check other providers/department schedules for availability:   Only shena pete  Comments:     Phone:  [phone]

## 2019-12-19 ENCOUNTER — OFFICE VISIT (OUTPATIENT)
Dept: FAMILY MEDICINE | Facility: CLINIC | Age: 69
End: 2019-12-19
Payer: MEDICARE

## 2019-12-19 VITALS
WEIGHT: 256.38 LBS | DIASTOLIC BLOOD PRESSURE: 80 MMHG | HEIGHT: 72 IN | HEART RATE: 112 BPM | SYSTOLIC BLOOD PRESSURE: 120 MMHG | RESPIRATION RATE: 20 BRPM | BODY MASS INDEX: 34.72 KG/M2

## 2019-12-19 DIAGNOSIS — K60.2 ANAL FISSURE: Primary | ICD-10-CM

## 2019-12-19 PROCEDURE — 99999 PR PBB SHADOW E&M-EST. PATIENT-LVL III: CPT | Mod: PBBFAC,,, | Performed by: FAMILY MEDICINE

## 2019-12-19 PROCEDURE — 99999 PR STA SHADOW: CPT | Mod: PBBFAC,,, | Performed by: FAMILY MEDICINE

## 2019-12-19 PROCEDURE — 99213 OFFICE O/P EST LOW 20 MIN: CPT | Mod: S$PBB | Performed by: FAMILY MEDICINE

## 2019-12-19 PROCEDURE — 99999 PR STA SHADOW: ICD-10-PCS | Mod: PBBFAC,,, | Performed by: FAMILY MEDICINE

## 2019-12-19 PROCEDURE — 99213 OFFICE O/P EST LOW 20 MIN: CPT | Mod: PBBFAC | Performed by: FAMILY MEDICINE

## 2019-12-19 RX ORDER — HYDROCODONE BITARTRATE AND ACETAMINOPHEN 7.5; 325 MG/1; MG/1
TABLET ORAL
Qty: 30 TABLET | Refills: 0 | Status: SHIPPED | OUTPATIENT
Start: 2019-12-19 | End: 2020-08-02

## 2019-12-19 RX ORDER — HYDROCORTISONE 25 MG/G
CREAM TOPICAL 2 TIMES DAILY
Qty: 28 G | Refills: 2 | Status: SHIPPED | OUTPATIENT
Start: 2019-12-19 | End: 2020-09-04

## 2019-12-19 RX ORDER — HYDROCORTISONE 25 MG/G
CREAM TOPICAL 2 TIMES DAILY
Qty: 28 G | Refills: 5 | Status: SHIPPED | OUTPATIENT
Start: 2019-12-19 | End: 2020-09-04

## 2019-12-19 NOTE — PROGRESS NOTES
Subjective:       Patient ID: Awilda Rosenthal is a 69 y.o. male.    Chief Complaint: Rectal Pain    HPI  Review of Systems   Constitutional: Negative for appetite change.   HENT: Negative for congestion, ear pain, sneezing and sore throat.    Eyes: Negative for redness and visual disturbance.   Respiratory: Negative for cough, chest tightness and stridor.    Cardiovascular: Negative for chest pain.   Gastrointestinal: Positive for abdominal pain. Negative for blood in stool, diarrhea, nausea and vomiting.        Angie fissure with terrible pain   Genitourinary: Negative for difficulty urinating, dysuria and hematuria.   Musculoskeletal: Negative for arthralgias, back pain, joint swelling, myalgias and neck pain.   Skin: Negative for rash.   Neurological: Negative for dizziness.   Psychiatric/Behavioral: Negative for agitation. The patient is not nervous/anxious.        Objective:      Physical Exam   Constitutional: He is oriented to person, place, and time. He appears well-developed and well-nourished.   HENT:   Head: Normocephalic.   Eyes: Pupils are equal, round, and reactive to light.   Neck: Normal range of motion. Neck supple. No thyromegaly present.   Cardiovascular: Normal rate and regular rhythm. Exam reveals no friction rub.   No murmur heard.  Pulmonary/Chest: Effort normal. No respiratory distress. He has no wheezes.   Abdominal: There is no tenderness. There is no rebound and no guarding.   Genitourinary:   Genitourinary Comments: Anus with 3+++ TTP   Musculoskeletal: Normal range of motion. He exhibits no edema or tenderness.   Lymphadenopathy:     He has no cervical adenopathy.   Neurological: He is alert and oriented to person, place, and time. He has normal reflexes. No cranial nerve deficit.   Skin: Skin is warm and dry.   Psychiatric: He has a normal mood and affect. Judgment and thought content normal.       Assessment:       1. Anal fissure        Plan:   Awilda was seen today for rectal  pain.    Diagnoses and all orders for this visit:    Anal fissure    Other orders  -     hydrocortisone 2.5 % cream; Apply topically 2 (two) times daily.  -     HYDROcodone-acetaminophen (NORCO) 7.5-325 mg per tablet; One po QD  For severe anal pain; opioid medically exempt & necessary for greater then 7 days

## 2019-12-30 ENCOUNTER — PATIENT OUTREACH (OUTPATIENT)
Dept: ADMINISTRATIVE | Facility: OTHER | Age: 69
End: 2019-12-30

## 2020-01-02 ENCOUNTER — OFFICE VISIT (OUTPATIENT)
Dept: SURGERY | Facility: CLINIC | Age: 70
End: 2020-01-02
Payer: MEDICARE

## 2020-01-02 ENCOUNTER — HOSPITAL ENCOUNTER (OUTPATIENT)
Dept: PULMONOLOGY | Facility: HOSPITAL | Age: 70
Discharge: HOME | End: 2020-01-02
Attending: COLON & RECTAL SURGERY
Payer: MEDICARE

## 2020-01-02 VITALS
WEIGHT: 253.31 LBS | BODY MASS INDEX: 34.31 KG/M2 | SYSTOLIC BLOOD PRESSURE: 132 MMHG | RESPIRATION RATE: 10 BRPM | DIASTOLIC BLOOD PRESSURE: 80 MMHG | HEART RATE: 76 BPM | HEIGHT: 72 IN

## 2020-01-02 DIAGNOSIS — K60.2 ANAL FISSURE: ICD-10-CM

## 2020-01-02 DIAGNOSIS — Z86.010 HISTORY OF COLON POLYPS: ICD-10-CM

## 2020-01-02 DIAGNOSIS — K60.2 ANAL FISSURE: Primary | ICD-10-CM

## 2020-01-02 PROCEDURE — 99213 OFFICE O/P EST LOW 20 MIN: CPT | Mod: PBBFAC,25 | Performed by: COLON & RECTAL SURGERY

## 2020-01-02 PROCEDURE — 93010 EKG 12-LEAD: ICD-10-PCS | Mod: ,,, | Performed by: INTERNAL MEDICINE

## 2020-01-02 PROCEDURE — 93005 ELECTROCARDIOGRAM TRACING: CPT

## 2020-01-02 PROCEDURE — 99999 PR STA SHADOW: ICD-10-PCS | Mod: PBBFAC,,, | Performed by: COLON & RECTAL SURGERY

## 2020-01-02 PROCEDURE — 99213 OFFICE O/P EST LOW 20 MIN: CPT | Mod: S$PBB | Performed by: COLON & RECTAL SURGERY

## 2020-01-02 PROCEDURE — 93010 ELECTROCARDIOGRAM REPORT: CPT | Mod: ,,, | Performed by: INTERNAL MEDICINE

## 2020-01-02 PROCEDURE — 99999 PR STA SHADOW: CPT | Mod: PBBFAC,,, | Performed by: COLON & RECTAL SURGERY

## 2020-01-02 PROCEDURE — 99999 PR PBB SHADOW E&M-EST. PATIENT-LVL III: CPT | Mod: PBBFAC,,, | Performed by: COLON & RECTAL SURGERY

## 2020-01-02 NOTE — H&P (VIEW-ONLY)
Subjective:       Patient ID: Awilda Rosenthal is a 69 y.o. male.    Chief Complaint: Anal Fissure  68 yo M on whom I performed a screening colonoscopy in November 2018 - he had 9 polyps removed ranging in size from 4-20 mm - all but 2 of them were adenomas on pathology. He underwent a follow-up colonoscopy on 10/24/2019, at which point four additional polyps were removed, ranging in size from 4-8 mm.  Only 1 of these polyps was an adenoma.    He then underwent a TRUS with prostate biopsies by Dr Giles 10/30/19 for follow-up of elevated PSA.  He states that he developed severe constipation afterwards.    Following this, he developed severe anal pain, particularly with bowel movements.  He describes the pain as sharp tearing sensation with bowel movements followed by dull aching for several hours afterwards.  He was then seen in the ER on 11/13/2019.  He was prescribed Anusol and stool softeners.    He saw me 11/21/19 for further evaluation.  His constipation had resolved, but he still c/o significant pain with his bowel movements.  No bleeding.    On examination, he had a deep, chronic appearing posterior midline anal fissure.  We instituted conservative measures including topical diltiazem, warm soaks, and stool softeners and fiber supplementation.  He was instructed follow up in 6 weeks.    He comes in today for follow-up.  He has had very little in the way of sustained improvement.  He may go a few days without much pain but then will have a bowel movement associated with significant discomfort and have pain for a few days afterwards.  He saw his PCP right before the holidays with worsening pain and was given a prescription for Norco which he says he is trying to use sparingly.  He is taking a stool softener 3 times a day in using topical diltiazem several times a day as well.    No known family hx of CRC or IBD.    Review of patient's allergies indicates:  No Known Allergies    Past Medical History:   Diagnosis  Date    Prostate cancer        Past Surgical History:   Procedure Laterality Date    APPENDECTOMY      BIOPSY WITH TRANSRECTAL ULTRASOUND (TRUS) GUIDANCE N/A 10/17/2018    Procedure: TRANSRECTAL PROSTATE ULTRASOUND-GUIDED NEEDLE BIOPSY;  Surgeon: Bashir Giles Jr., MD;  Location: [facility];  Service: Urology;  Laterality: N/A;    BIOPSY WITH TRANSRECTAL ULTRASOUND (TRUS) GUIDANCE Bilateral 10/30/2019    Procedure: BIOPSY-TRUS;  Surgeon: Bashir Giles Jr., MD;  Location: [facility];  Service: Urology;  Laterality: Bilateral;    COLONOSCOPY N/A 11/1/2018    Procedure: COLONOSCOPY;  Surgeon: Ryaln Razo MD;  Location: [facility];  Service: Colon and Rectal;  Laterality: N/A;    COLONOSCOPY N/A 10/24/2019    Procedure: COLONOSCOPY;  Surgeon: Rylan Razo MD;  Location: [facility];  Service: Colon and Rectal;  Laterality: N/A;    kidney stones         Current Outpatient Medications   Medication Sig Dispense Refill    aspirin (ECOTRIN) 81 MG EC tablet Take 81 mg by mouth once daily.      docusate sodium (COLACE) 100 MG capsule Take 1 capsule (100 mg total) by mouth 2 (two) times daily as needed for Constipation. 30 capsule 0    HYDROcodone-acetaminophen (NORCO) 7.5-325 mg per tablet Take one tablet by mouth once a day for severe anal pain; opioid medically exempt & necessary for greater then 7 days 30 tablet 0    clotrimazole-betamethasone 1-0.05% (LOTRISONE) cream Apply to head of  Penis BId (Patient not taking: Reported on 1/2/2020) 45 g 1    diltiazem HCl (DILTIAZEM 2% CREAM) Apply topically 3 (three) times daily. Apply topically to anal area. (Patient not taking: Reported on 1/2/2020) 50 g 5    hydrocortisone 2.5 % cream Apply topically 2 (two) times daily. (Patient not taking: Reported on 1/2/2020) 1 Tube 0    hydrocortisone 2.5 % cream Apply topically 2 (two) times daily. (Patient not taking: Reported on 1/2/2020) 28 g 2    hydrocortisone 2.5 % cream Apply topically to affected area(s) 2 (two) times  daily. (Patient not taking: Reported on 1/2/2020) 28 g 5    sulfamethoxazole-trimethoprim 800-160mg (BACTRIM DS) 800-160 mg Tab TAKE ONE TABLET BY MOUTH EVERY TWELVE HOURS STARTING THE DAY BEFORE prostate biopsy FOR 4 doses ONLY  0    tadalafil (CIALIS) 20 MG Tab Take 1 tablet (20 mg total) by mouth daily as needed. (Patient not taking: Reported on 1/2/2020) 8 tablet 5    vitamin D 1000 units Tab Take 2,000 Units by mouth once daily.       No current facility-administered medications for this visit.        Family History   Problem Relation Age of Onset    Heart disease Father        Social History     Socioeconomic History    Marital status: Single     Spouse name: Not on file    Number of children: Not on file    Years of education: Not on file    Highest education level: Not on file   Occupational History     Employer: OTHER   Social Needs    Financial resource strain: Not on file    Food insecurity:     Worry: Not on file     Inability: Not on file    Transportation needs:     Medical: Not on file     Non-medical: Not on file   Tobacco Use    Smoking status: Never Smoker    Smokeless tobacco: Never Used   Substance and Sexual Activity    Alcohol use: No    Drug use: No    Sexual activity: Not on file   Lifestyle    Physical activity:     Days per week: Not on file     Minutes per session: Not on file    Stress: Not on file   Relationships    Social connections:     Talks on phone: Not on file     Gets together: Not on file     Attends Taoist service: Not on file     Active member of club or organization: Not on file     Attends meetings of clubs or organizations: Not on file     Relationship status: Not on file   Other Topics Concern    Not on file   Social History Narrative    Not on file       Review of Systems   Constitutional: Negative for chills and fever.   HENT: Negative for congestion and sinus pressure.    Eyes: Negative for visual disturbance.   Respiratory: Negative for cough  and shortness of breath.    Cardiovascular: Negative for chest pain and palpitations.   Gastrointestinal: Positive for rectal pain. Negative for abdominal distention, abdominal pain, anal bleeding, blood in stool, constipation, diarrhea, nausea and vomiting.   Endocrine: Negative for cold intolerance and heat intolerance.   Genitourinary: Positive for frequency. Negative for dysuria.   Musculoskeletal: Negative for arthralgias and back pain.   Skin: Negative for rash.   Allergic/Immunologic: Negative for immunocompromised state.   Neurological: Negative for dizziness, light-headedness and headaches.   Psychiatric/Behavioral: Negative for confusion. The patient is not nervous/anxious.        Objective:      Physical Exam   Constitutional: He is oriented to person, place, and time. He appears well-developed and well-nourished.   HENT:   Head: Normocephalic and atraumatic.   Eyes: Conjunctivae are normal.   Pulmonary/Chest: Effort normal. No respiratory distress.   Abdominal: Soft. He exhibits no distension and no mass. There is no tenderness. There is no rebound and no guarding.   Genitourinary:   Genitourinary Comments: Perineum - normal perianal skin, no mass, + deep, chronic appearing posterior midline fissure, + non thrombosed external hemorrhoid RL position  JIMBO/Anoscopy - not performed due to the fissure     Neurological: He is alert and oriented to person, place, and time.   Skin: Skin is warm and dry. No erythema.         Lab Results   Component Value Date    WBC 5.54 06/29/2018    HGB 11.5 (L) 06/29/2018    HCT 38.7 (L) 06/29/2018    MCV 76 (L) 06/29/2018     (H) 06/29/2018     BMP  Lab Results   Component Value Date     06/29/2018    K 4.4 06/29/2018     06/29/2018    CO2 24 06/29/2018    BUN 15 06/29/2018    CREATININE 1.0 06/29/2018    CALCIUM 9.1 06/29/2018    ANIONGAP 7 (L) 06/29/2018    ESTGFRAFRICA >60 06/29/2018    EGFRNONAA >60 06/29/2018     CMP  Sodium   Date Value Ref Range  Status   06/29/2018 138 136 - 145 mmol/L Final     Potassium   Date Value Ref Range Status   06/29/2018 4.4 3.5 - 5.1 mmol/L Final     Chloride   Date Value Ref Range Status   06/29/2018 107 95 - 110 mmol/L Final     CO2   Date Value Ref Range Status   06/29/2018 24 23 - 29 mmol/L Final     Glucose   Date Value Ref Range Status   06/29/2018 95 70 - 110 mg/dL Final     BUN, Bld   Date Value Ref Range Status   06/29/2018 15 8 - 23 mg/dL Final     Creatinine   Date Value Ref Range Status   06/29/2018 1.0 0.5 - 1.4 mg/dL Final     Calcium   Date Value Ref Range Status   06/29/2018 9.1 8.7 - 10.5 mg/dL Final     Total Protein   Date Value Ref Range Status   06/29/2018 7.0 6.0 - 8.4 g/dL Final     Albumin   Date Value Ref Range Status   06/29/2018 3.6 3.5 - 5.2 g/dL Final     Total Bilirubin   Date Value Ref Range Status   06/29/2018 0.5 0.1 - 1.0 mg/dL Final     Comment:     For infants and newborns, interpretation of results should be based  on gestational age, weight and in agreement with clinical  observations.  Premature Infant recommended reference ranges:  Up to 24 hours.............<8.0 mg/dL  Up to 48 hours............<12.0 mg/dL  3-5 days..................<15.0 mg/dL  6-29 days.................<15.0 mg/dL       Alkaline Phosphatase   Date Value Ref Range Status   06/29/2018 95 55 - 135 U/L Final     AST   Date Value Ref Range Status   06/29/2018 16 10 - 40 U/L Final     ALT   Date Value Ref Range Status   06/29/2018 13 10 - 44 U/L Final     Anion Gap   Date Value Ref Range Status   06/29/2018 7 (L) 8 - 16 mmol/L Final     eGFR if    Date Value Ref Range Status   06/29/2018 >60 >60 mL/min/1.73 m^2 Final     eGFR if non    Date Value Ref Range Status   06/29/2018 >60 >60 mL/min/1.73 m^2 Final     Comment:     Calculation used to obtain the estimated glomerular filtration  rate (eGFR) is the CKD-EPI equation.        No results found for: CEA        Assessment:       1. Anal fissure     2. History of colon polyps        Plan:   He has not had any durable improvement in his symptoms with conservative measures, so I discussed surgical intervention (LIAS) with him.  After discussing the pros and cons, he has elected to proceed with surgery.    He is scheduled for an LIAS at [facility] on 01/09/2020.    I have discussed the procedure at length with Awilda Rosenthal.  We discussed the rationale, risks, benefits, and alternatives in depth.  We discussed the expected outcomes and potential complications including but not limited to bleeding, infection, recurrence, prolonged pain, need for further procedures and altered continence.  He verbalized his understanding of the procedure and wishes to proceed.  Written consent was obtained.    Rylan Razo MD, FACS, FASCRS  Senior Staff Surgeon  Department of Colon & Rectal Surgery

## 2020-01-02 NOTE — PROGRESS NOTES
Subjective:       Patient ID: Awilda Rosenthal is a 69 y.o. male.    Chief Complaint: Anal Fissure  68 yo M on whom I performed a screening colonoscopy in November 2018 - he had 9 polyps removed ranging in size from 4-20 mm - all but 2 of them were adenomas on pathology. He underwent a follow-up colonoscopy on 10/24/2019, at which point four additional polyps were removed, ranging in size from 4-8 mm.  Only 1 of these polyps was an adenoma.    He then underwent a TRUS with prostate biopsies by Dr Giles 10/30/19 for follow-up of elevated PSA.  He states that he developed severe constipation afterwards.    Following this, he developed severe anal pain, particularly with bowel movements.  He describes the pain as sharp tearing sensation with bowel movements followed by dull aching for several hours afterwards.  He was then seen in the ER on 11/13/2019.  He was prescribed Anusol and stool softeners.    He saw me 11/21/19 for further evaluation.  His constipation had resolved, but he still c/o significant pain with his bowel movements.  No bleeding.    On examination, he had a deep, chronic appearing posterior midline anal fissure.  We instituted conservative measures including topical diltiazem, warm soaks, and stool softeners and fiber supplementation.  He was instructed follow up in 6 weeks.    He comes in today for follow-up.  He has had very little in the way of sustained improvement.  He may go a few days without much pain but then will have a bowel movement associated with significant discomfort and have pain for a few days afterwards.  He saw his PCP right before the holidays with worsening pain and was given a prescription for Norco which he says he is trying to use sparingly.  He is taking a stool softener 3 times a day in using topical diltiazem several times a day as well.    No known family hx of CRC or IBD.    Review of patient's allergies indicates:  No Known Allergies    Past Medical History:   Diagnosis  Date    Prostate cancer        Past Surgical History:   Procedure Laterality Date    APPENDECTOMY      BIOPSY WITH TRANSRECTAL ULTRASOUND (TRUS) GUIDANCE N/A 10/17/2018    Procedure: TRANSRECTAL PROSTATE ULTRASOUND-GUIDED NEEDLE BIOPSY;  Surgeon: Bashir Giles Jr., MD;  Location: [facility] OR;  Service: Urology;  Laterality: N/A;    BIOPSY WITH TRANSRECTAL ULTRASOUND (TRUS) GUIDANCE Bilateral 10/30/2019    Procedure: BIOPSY-TRUS;  Surgeon: Bashir Giles Jr., MD;  Location: [facility] OR;  Service: Urology;  Laterality: Bilateral;    COLONOSCOPY N/A 11/1/2018    Procedure: COLONOSCOPY;  Surgeon: Rylan Razo MD;  Location: [facility];  Service: Colon and Rectal;  Laterality: N/A;    COLONOSCOPY N/A 10/24/2019    Procedure: COLONOSCOPY;  Surgeon: Rylan Razo MD;  Location: [facility];  Service: Colon and Rectal;  Laterality: N/A;    kidney stones         Current Outpatient Medications   Medication Sig Dispense Refill    aspirin (ECOTRIN) 81 MG EC tablet Take 81 mg by mouth once daily.      docusate sodium (COLACE) 100 MG capsule Take 1 capsule (100 mg total) by mouth 2 (two) times daily as needed for Constipation. 30 capsule 0    HYDROcodone-acetaminophen (NORCO) 7.5-325 mg per tablet Take one tablet by mouth once a day for severe anal pain; opioid medically exempt & necessary for greater then 7 days 30 tablet 0    clotrimazole-betamethasone 1-0.05% (LOTRISONE) cream Apply to head of  Penis BId (Patient not taking: Reported on 1/2/2020) 45 g 1    diltiazem HCl (DILTIAZEM 2% CREAM) Apply topically 3 (three) times daily. Apply topically to anal area. (Patient not taking: Reported on 1/2/2020) 50 g 5    hydrocortisone 2.5 % cream Apply topically 2 (two) times daily. (Patient not taking: Reported on 1/2/2020) 1 Tube 0    hydrocortisone 2.5 % cream Apply topically 2 (two) times daily. (Patient not taking: Reported on 1/2/2020) 28 g 2    hydrocortisone 2.5 % cream Apply topically to affected area(s) 2 (two) times  daily. (Patient not taking: Reported on 1/2/2020) 28 g 5    sulfamethoxazole-trimethoprim 800-160mg (BACTRIM DS) 800-160 mg Tab TAKE ONE TABLET BY MOUTH EVERY TWELVE HOURS STARTING THE DAY BEFORE prostate biopsy FOR 4 doses ONLY  0    tadalafil (CIALIS) 20 MG Tab Take 1 tablet (20 mg total) by mouth daily as needed. (Patient not taking: Reported on 1/2/2020) 8 tablet 5    vitamin D 1000 units Tab Take 2,000 Units by mouth once daily.       No current facility-administered medications for this visit.        Family History   Problem Relation Age of Onset    Heart disease Father        Social History     Socioeconomic History    Marital status: Single     Spouse name: Not on file    Number of children: Not on file    Years of education: Not on file    Highest education level: Not on file   Occupational History     Employer: OTHER   Social Needs    Financial resource strain: Not on file    Food insecurity:     Worry: Not on file     Inability: Not on file    Transportation needs:     Medical: Not on file     Non-medical: Not on file   Tobacco Use    Smoking status: Never Smoker    Smokeless tobacco: Never Used   Substance and Sexual Activity    Alcohol use: No    Drug use: No    Sexual activity: Not on file   Lifestyle    Physical activity:     Days per week: Not on file     Minutes per session: Not on file    Stress: Not on file   Relationships    Social connections:     Talks on phone: Not on file     Gets together: Not on file     Attends [employer] service: Not on file     Active member of club or organization: Not on file     Attends meetings of clubs or organizations: Not on file     Relationship status: Not on file   Other Topics Concern    Not on file   Social History Narrative    Not on file       Review of Systems   Constitutional: Negative for chills and fever.   HENT: Negative for congestion and sinus pressure.    Eyes: Negative for visual disturbance.   Respiratory: Negative for cough  and shortness of breath.    Cardiovascular: Negative for chest pain and palpitations.   Gastrointestinal: Positive for rectal pain. Negative for abdominal distention, abdominal pain, anal bleeding, blood in stool, constipation, diarrhea, nausea and vomiting.   Endocrine: Negative for cold intolerance and heat intolerance.   Genitourinary: Positive for frequency. Negative for dysuria.   Musculoskeletal: Negative for arthralgias and back pain.   Skin: Negative for rash.   Allergic/Immunologic: Negative for immunocompromised state.   Neurological: Negative for dizziness, light-headedness and headaches.   Psychiatric/Behavioral: Negative for confusion. The patient is not nervous/anxious.        Objective:      Physical Exam   Constitutional: He is oriented to person, place, and time. He appears well-developed and well-nourished.   HENT:   Head: Normocephalic and atraumatic.   Eyes: Conjunctivae are normal.   Pulmonary/Chest: Effort normal. No respiratory distress.   Abdominal: Soft. He exhibits no distension and no mass. There is no tenderness. There is no rebound and no guarding.   Genitourinary:   Genitourinary Comments: Perineum - normal perianal skin, no mass, + deep, chronic appearing posterior midline fissure, + non thrombosed external hemorrhoid RL position  JIMBO/Anoscopy - not performed due to the fissure     Neurological: He is alert and oriented to person, place, and time.   Skin: Skin is warm and dry. No erythema.         Lab Results   Component Value Date    WBC 5.54 06/29/2018    HGB 11.5 (L) 06/29/2018    HCT 38.7 (L) 06/29/2018    MCV 76 (L) 06/29/2018     (H) 06/29/2018     BMP  Lab Results   Component Value Date     06/29/2018    K 4.4 06/29/2018     06/29/2018    CO2 24 06/29/2018    BUN 15 06/29/2018    CREATININE 1.0 06/29/2018    CALCIUM 9.1 06/29/2018    ANIONGAP 7 (L) 06/29/2018    ESTGFRAFRICA >60 06/29/2018    EGFRNONAA >60 06/29/2018     CMP  Sodium   Date Value Ref Range  Status   06/29/2018 138 136 - 145 mmol/L Final     Potassium   Date Value Ref Range Status   06/29/2018 4.4 3.5 - 5.1 mmol/L Final     Chloride   Date Value Ref Range Status   06/29/2018 107 95 - 110 mmol/L Final     CO2   Date Value Ref Range Status   06/29/2018 24 23 - 29 mmol/L Final     Glucose   Date Value Ref Range Status   06/29/2018 95 70 - 110 mg/dL Final     BUN, Bld   Date Value Ref Range Status   06/29/2018 15 8 - 23 mg/dL Final     Creatinine   Date Value Ref Range Status   06/29/2018 1.0 0.5 - 1.4 mg/dL Final     Calcium   Date Value Ref Range Status   06/29/2018 9.1 8.7 - 10.5 mg/dL Final     Total Protein   Date Value Ref Range Status   06/29/2018 7.0 6.0 - 8.4 g/dL Final     Albumin   Date Value Ref Range Status   06/29/2018 3.6 3.5 - 5.2 g/dL Final     Total Bilirubin   Date Value Ref Range Status   06/29/2018 0.5 0.1 - 1.0 mg/dL Final     Comment:     For infants and newborns, interpretation of results should be based  on gestational age, weight and in agreement with clinical  observations.  Premature Infant recommended reference ranges:  Up to 24 hours.............<8.0 mg/dL  Up to 48 hours............<12.0 mg/dL  3-5 days..................<15.0 mg/dL  6-29 days.................<15.0 mg/dL       Alkaline Phosphatase   Date Value Ref Range Status   06/29/2018 95 55 - 135 U/L Final     AST   Date Value Ref Range Status   06/29/2018 16 10 - 40 U/L Final     ALT   Date Value Ref Range Status   06/29/2018 13 10 - 44 U/L Final     Anion Gap   Date Value Ref Range Status   06/29/2018 7 (L) 8 - 16 mmol/L Final     eGFR if    Date Value Ref Range Status   06/29/2018 >60 >60 mL/min/1.73 m^2 Final     eGFR if non    Date Value Ref Range Status   06/29/2018 >60 >60 mL/min/1.73 m^2 Final     Comment:     Calculation used to obtain the estimated glomerular filtration  rate (eGFR) is the CKD-EPI equation.        No results found for: CEA        Assessment:       1. Anal fissure     2. History of colon polyps        Plan:   He has not had any durable improvement in his symptoms with conservative measures, so I discussed surgical intervention (LIAS) with him.  After discussing the pros and cons, he has elected to proceed with surgery.    He is scheduled for an LIAS at [facility] on 01/09/2020.    I have discussed the procedure at length with Awilda Rosenthal.  We discussed the rationale, risks, benefits, and alternatives in depth.  We discussed the expected outcomes and potential complications including but not limited to bleeding, infection, recurrence, prolonged pain, need for further procedures and altered continence.  He verbalized his understanding of the procedure and wishes to proceed.  Written consent was obtained.    Rylan Razo MD, FACS, FASCRS  Senior Staff Surgeon  Department of Colon & Rectal Surgery

## 2020-01-02 NOTE — LETTER
January 2, 2020      Dustin Pete MD  [address]  [facility] [address]           [place] - Colon/Rectal Surg  [address] [facility] [address]  Phone: [phone]          Patient: Awilda Rosenthal   MR Number: [id number]   YOB: 1950   Date of Visit: 1/2/2020       Dear Dr. Pete:    Thank you for referring Awilda Rosenthal to me for evaluation. Attached you will find relevant portions of my assessment and plan of care.    If you have questions, please do not hesitate to call me. I look forward to following Awilda Rosenthal along with you.    Sincerely,    Rylan Razo MD    Enclosure  CC:  No Recipients    If you would like to receive this communication electronically, please contact [email or web address] or [phone] to request more information on [employer] Link access.    For providers and/or their staff who would like to refer a patient to [facility], please contact us through our one-stop-shop provider referral line, [facility] , at [phone].    If you feel you have received this communication in error or would no longer like to receive these types of communications, please e-mail [email or web address]

## 2020-01-09 ENCOUNTER — ANESTHESIA (OUTPATIENT)
Dept: SURGERY | Facility: HOSPITAL | Age: 70
End: 2020-01-09
Payer: MEDICARE

## 2020-01-09 ENCOUNTER — ANESTHESIA EVENT (OUTPATIENT)
Dept: SURGERY | Facility: HOSPITAL | Age: 70
End: 2020-01-09
Payer: MEDICARE

## 2020-01-09 ENCOUNTER — HOSPITAL ENCOUNTER (OUTPATIENT)
Facility: HOSPITAL | Age: 70
Discharge: HOME | End: 2020-01-09
Attending: COLON & RECTAL SURGERY | Admitting: COLON & RECTAL SURGERY
Payer: MEDICARE

## 2020-01-09 VITALS
SYSTOLIC BLOOD PRESSURE: 116 MMHG | TEMPERATURE: 96 F | WEIGHT: 250.44 LBS | HEIGHT: 72 IN | OXYGEN SATURATION: 97 % | BODY MASS INDEX: 33.92 KG/M2 | DIASTOLIC BLOOD PRESSURE: 67 MMHG | RESPIRATION RATE: 18 BRPM | HEART RATE: 75 BPM

## 2020-01-09 DIAGNOSIS — K60.2 ANAL FISSURE: ICD-10-CM

## 2020-01-09 PROCEDURE — 25000003 PHARM REV CODE 250: Performed by: COLON & RECTAL SURGERY

## 2020-01-09 PROCEDURE — 00902 ANES ANORECTAL PX: CPT | Mod: QZ | Performed by: NURSE ANESTHETIST, CERTIFIED REGISTERED

## 2020-01-09 PROCEDURE — 63600175 PHARM REV CODE 636 W HCPCS: Performed by: NURSE ANESTHETIST, CERTIFIED REGISTERED

## 2020-01-09 PROCEDURE — 71000033 HC RECOVERY, INTIAL HOUR: Performed by: COLON & RECTAL SURGERY

## 2020-01-09 PROCEDURE — 36000706: Performed by: COLON & RECTAL SURGERY

## 2020-01-09 PROCEDURE — 46080 SPHNCTROTMY ANAL DIV SPHNCTR: CPT | Mod: ,,, | Performed by: COLON & RECTAL SURGERY

## 2020-01-09 PROCEDURE — 37000008 HC ANESTHESIA 1ST 15 MINUTES: Performed by: COLON & RECTAL SURGERY

## 2020-01-09 PROCEDURE — 37000009 HC ANESTHESIA EA ADD 15 MINS: Performed by: COLON & RECTAL SURGERY

## 2020-01-09 PROCEDURE — 46080 PR ANAL SPHINCTEROTOMY: ICD-10-PCS | Mod: ,,, | Performed by: COLON & RECTAL SURGERY

## 2020-01-09 PROCEDURE — 36000707: Performed by: COLON & RECTAL SURGERY

## 2020-01-09 RX ORDER — MUPIROCIN 20 MG/G
OINTMENT TOPICAL
Status: DISCONTINUED | OUTPATIENT
Start: 2020-01-09 | End: 2020-09-04

## 2020-01-09 RX ORDER — LIDOCAINE HYDROCHLORIDE 10 MG/ML
1 INJECTION, SOLUTION EPIDURAL; INFILTRATION; INTRACAUDAL; PERINEURAL ONCE
Status: DISCONTINUED | OUTPATIENT
Start: 2020-01-09 | End: 2020-09-04

## 2020-01-09 RX ORDER — LIDOCAINE HYDROCHLORIDE 10 MG/ML
INJECTION, SOLUTION EPIDURAL; INFILTRATION; INTRACAUDAL; PERINEURAL
Status: DISCONTINUED | OUTPATIENT
Start: 2020-01-09 | End: 2020-01-09 | Stop reason: HOSPADM

## 2020-01-09 RX ORDER — PROPOFOL 10 MG/ML
INJECTION, EMULSION INTRAVENOUS
Status: DISCONTINUED | OUTPATIENT
Start: 2020-01-09 | End: 2020-01-09

## 2020-01-09 RX ORDER — OXYCODONE AND ACETAMINOPHEN 5; 325 MG/1; MG/1
1 TABLET ORAL EVERY 6 HOURS PRN
Qty: 25 TABLET | Refills: 0 | Status: SHIPPED | OUTPATIENT
Start: 2020-01-09 | End: 2020-09-04

## 2020-01-09 RX ORDER — MIDAZOLAM HYDROCHLORIDE 1 MG/ML
INJECTION, SOLUTION INTRAMUSCULAR; INTRAVENOUS
Status: DISCONTINUED | OUTPATIENT
Start: 2020-01-09 | End: 2020-01-09

## 2020-01-09 RX ORDER — LIDOCAINE HCL/PF 100 MG/5ML
SYRINGE (ML) INTRAVENOUS
Status: DISCONTINUED | OUTPATIENT
Start: 2020-01-09 | End: 2020-01-09

## 2020-01-09 RX ORDER — BUPIVACAINE HYDROCHLORIDE 2.5 MG/ML
INJECTION, SOLUTION EPIDURAL; INFILTRATION; INTRACAUDAL
Status: DISCONTINUED | OUTPATIENT
Start: 2020-01-09 | End: 2020-01-09 | Stop reason: HOSPADM

## 2020-01-09 RX ORDER — SODIUM CHLORIDE, SODIUM LACTATE, POTASSIUM CHLORIDE, CALCIUM CHLORIDE 600; 310; 30; 20 MG/100ML; MG/100ML; MG/100ML; MG/100ML
INJECTION, SOLUTION INTRAVENOUS CONTINUOUS PRN
Status: DISCONTINUED | OUTPATIENT
Start: 2020-01-09 | End: 2020-01-09

## 2020-01-09 RX ORDER — OXYCODONE AND ACETAMINOPHEN 5; 325 MG/1; MG/1
1 TABLET ORAL EVERY 4 HOURS PRN
Status: DISCONTINUED | OUTPATIENT
Start: 2020-01-09 | End: 2020-01-09 | Stop reason: HOSPADM

## 2020-01-09 RX ORDER — SODIUM CHLORIDE 9 MG/ML
INJECTION, SOLUTION INTRAVENOUS CONTINUOUS
Status: DISCONTINUED | OUTPATIENT
Start: 2020-01-09 | End: 2020-09-04

## 2020-01-09 RX ORDER — FENTANYL CITRATE 50 UG/ML
INJECTION, SOLUTION INTRAMUSCULAR; INTRAVENOUS
Status: DISCONTINUED | OUTPATIENT
Start: 2020-01-09 | End: 2020-01-09

## 2020-01-09 RX ADMIN — PROPOFOL 50 MG: 10 INJECTION, EMULSION INTRAVENOUS at 09:01

## 2020-01-09 RX ADMIN — MIDAZOLAM 2 MG: 1 INJECTION INTRAMUSCULAR; INTRAVENOUS at 09:01

## 2020-01-09 RX ADMIN — SODIUM CHLORIDE, SODIUM LACTATE, POTASSIUM CHLORIDE, AND CALCIUM CHLORIDE: .6; .31; .03; .02 INJECTION, SOLUTION INTRAVENOUS at 09:01

## 2020-01-09 RX ADMIN — FENTANYL CITRATE 50 MCG: 50 INJECTION, SOLUTION INTRAMUSCULAR; INTRAVENOUS at 09:01

## 2020-01-09 RX ADMIN — PROPOFOL 100 MG: 10 INJECTION, EMULSION INTRAVENOUS at 09:01

## 2020-01-09 RX ADMIN — LIDOCAINE HYDROCHLORIDE 100 MG: 20 INJECTION, SOLUTION INTRAVENOUS at 09:01

## 2020-01-09 NOTE — NURSING
Pt in stable condition. Ambulating, voiding,, tolerating PO. Discharged home. Reviewed discharge instructions , follow up appt, medications and reportable signs and symptoms with pt and wife; states understanding.

## 2020-01-09 NOTE — NURSING
Patient down to OR via bed per surgery team. SCDs in place. Shaheed hose applied. Side rails up x2.

## 2020-01-09 NOTE — ANESTHESIA POSTPROCEDURE EVALUATION
Anesthesia Post Evaluation    Patient: Awilda Rosenthal    Procedure(s) Performed: Procedure(s) (LRB):  SPHINCTEROTOMY-LATERAL INTERNAL ANAL (N/A)    Final Anesthesia Type: general    Patient location during evaluation: PACU  Patient participation: Yes- Able to Participate  Level of consciousness: awake and alert, oriented and awake  Post-procedure vital signs: reviewed and stable  Pain management: adequate  Airway patency: patent    PONV status at discharge: No PONV  Anesthetic complications: no      Cardiovascular status: blood pressure returned to baseline, hemodynamically stable and stable  Respiratory status: unassisted, spontaneous ventilation and room air  Hydration status: euvolemic  Follow-up not needed.          Vitals Value Taken Time   /65 1/9/2020 11:15 AM   Temp 35.7 °C (96.3 °F) 1/9/2020 11:15 AM   Pulse 68 1/9/2020 11:15 AM   Resp 18 1/9/2020 11:15 AM   SpO2 96 % 1/9/2020 11:15 AM         Event Time     Out of Recovery 10:36:54          Pain/Sabra Score: Sabra Score: 10 (1/9/2020 10:08 AM)

## 2020-01-09 NOTE — ANESTHESIA PREPROCEDURE EVALUATION
01/09/2020  Awilda Rosenthal is a 69 y.o., male.    Pre-op Assessment    I have reviewed the Patient Summary Reports.     I have reviewed the Nursing Notes.   I have reviewed the Medications.     Review of Systems  Anesthesia Hx:  No problems with previous Anesthesia  History of prior surgery of interest to airway management or planning:  Denies Personal Hx of Anesthesia complications.   Social:  Non-Smoker, No Alcohol Use    Hematology/Oncology:  Hematology Normal      Current/Recent Cancer. Other (see Oncology comments) Oncology Comments: Current prostate CA    EENT/Dental:EENT/Dental Normal   Cardiovascular:  Cardiovascular Normal Exercise tolerance: good     Pulmonary:  Pulmonary Normal    Renal/:   renal calculi Prostate CA   Hepatic/GI:  Hepatic/GI Normal    Musculoskeletal:  Musculoskeletal Normal    Neurological:  Neurology Normal    Endocrine:  Endocrine Normal    Dermatological:  Skin Normal    Psych:  Psychiatric Normal           Physical Exam  General:  Obesity    Airway/Jaw/Neck:  Airway Findings: Mouth Opening: Normal Tongue: Normal  General Airway Assessment: Adult  Mallampati: II  TM Distance: Normal, at least 6 cm      Dental:  Dental Findings: upper partial dentures             Anesthesia Plan  Type of Anesthesia, risks & benefits discussed:  Anesthesia Type:  general  Patient's Preference:   Intra-op Monitoring Plan: standard ASA monitors  Intra-op Monitoring Plan Comments:   Post Op Pain Control Plan:   Post Op Pain Control Plan Comments:   Induction:   IV  Beta Blocker:  Patient is not currently on a Beta-Blocker (No further documentation required).       Informed Consent: Patient understands risks and agrees with Anesthesia plan.  Questions answered. Anesthesia consent signed with patient.  ASA Score: 3     Day of Surgery Review of History & Physical: I have interviewed and examined  the patient. I have reviewed the patient's H&P dated: 1/9/20. There are no significant changes.  H&P update referred to the surgeon.         Ready For Surgery From Anesthesia Perspective.

## 2020-01-09 NOTE — TRANSFER OF CARE
Anesthesia Transfer of Care Note    Patient: Awilda Rosenthal    Procedure(s) Performed: Procedure(s) (LRB):  SPHINCTEROTOMY-LATERAL INTERNAL ANAL (N/A)    Patient location: PACU    Anesthesia Type: general    Transport from OR: Transported from OR on 6-10 L/min O2 by face mask with adequate spontaneous ventilation    Post pain: adequate analgesia    Post assessment: no apparent anesthetic complications and tolerated procedure well    Post vital signs: stable    Level of consciousness: sedated    Nausea/Vomiting: no nausea/vomiting    Complications: none    Transfer of care protocol was followed      Last vitals:   Visit Vitals  /73 (BP Location: Right arm, Patient Position: Lying)   Pulse 68   Temp 35.7 °C (96.2 °F) (Oral)   Resp 18   Ht 6' (1.829 m)   Wt 113.6 kg (250 lb 7.1 oz)   SpO2 97%   BMI 33.97 kg/m²

## 2020-01-09 NOTE — NURSING
Patient back up to room 313 via bed. Bedside report per YUMI Nieves. No distress noted. Vital signs stable. Patient awake, alert, oriented. Denies pain. Call bell in reach. Encouraged to call for assistance.

## 2020-01-09 NOTE — OP NOTE
DATE OF PROCEDURE:  01/09/2020     PREOPERATIVE DIAGNOSES: Anal fissure     POSTOPERATIVE DIAGNOSES: Anal fissure    PROCEDURES PERFORMED: Lateral Internal Anal Spincterotomy     SURGEON:  Rylan Razo M.D.     ASSISTANT:  None     ANESTHESIA: Local/MAC     IV FLUIDS:   500 mL of crystalloid.     ESTIMATED BLOOD LOSS:  <5 cc mL.     DRAINS: None     SPECIMENS: None    COMPLICATIONS:  None.     OPERATIVE FINDINGS:  Chronic appearing posterior midline anal fissure with hypertonic internal anal sphincter.     INDICATIONS:  The patient is a 69-year-old male with a chronic anal fissure that has not shown improvement with conservative measures.  After discussing management options, he has elected to proceed with an internal anal sphincterotomy.       DESCRIPTION OF PROCEDURE:  The patient was identified, brought to the Operating Room and placed on the table in a prone position after obtaining informed consent, ensuring adequate padding of all pressure points.  Venous sequential stockings were placed.  After initiation of monitored anesthesia care, the table was jackknifed and the buttocks were taped apart to efface the anal verge.  The perianal region was prepped and draped in the usual sterile fashion.  An anal block was performed using a combination of Exparel and 0.25% Marcaine.  Inspection revealed a deep, chronic appearing posterior midline anal fissure.  The anal canal was evaluated using a bivalve anal speculum.  No other significant findings were noted.  The anal sphincter was placed on stretch using the bivalve retractor. The intersphincteric groove in the right lateral position was palpated, and the internal anal sphincter was noted to be hypertonic.  A closed lateral internal anal sphincterotomy was then performed in the right lateral position, using a #11 blade to divide the distal aspect of the internal anal sphincter.  Upon performing the sphincterotomy , there was adequate release of the intersphincteric  band.  The incision was closed with a single 3-0 chronic figure-of-eight suture.  The anal canal was irrigated and hemostasis was noted to be adequate.  A GelFoam roll was placed in  the anal canal and sterile dressings were applied.     The patient tolerated the procedure well with no complications.  He was awakened from sedation in the Operating Room and taken to Recovery in satisfactory condition.  All needle, instrument and sponge counts were correct at the end of the case.   I was present throughout the entire procedure.    Rylan Razo MD, FACS, FASCRS  Senior Staff Surgeon  Department of Colon & Rectal Surgery     This note was created using voice recognition software, and may contain some unrecognized transcriptional errors.

## 2020-01-30 ENCOUNTER — TELEPHONE (OUTPATIENT)
Dept: SURGERY | Facility: CLINIC | Age: 70
End: 2020-01-30

## 2020-01-30 ENCOUNTER — OFFICE VISIT (OUTPATIENT)
Dept: SURGERY | Facility: CLINIC | Age: 70
End: 2020-01-30
Payer: MEDICARE

## 2020-01-30 VITALS
BODY MASS INDEX: 34.84 KG/M2 | SYSTOLIC BLOOD PRESSURE: 126 MMHG | RESPIRATION RATE: 16 BRPM | WEIGHT: 257.25 LBS | DIASTOLIC BLOOD PRESSURE: 75 MMHG | HEIGHT: 72 IN | HEART RATE: 94 BPM

## 2020-01-30 DIAGNOSIS — K60.2 ANAL FISSURE: Primary | ICD-10-CM

## 2020-01-30 DIAGNOSIS — Z86.010 HISTORY OF COLON POLYPS: ICD-10-CM

## 2020-01-30 PROCEDURE — 99999 PR STA SHADOW: CPT | Mod: PBBFAC,,, | Performed by: COLON & RECTAL SURGERY

## 2020-01-30 PROCEDURE — 99999 PR PBB SHADOW E&M-EST. PATIENT-LVL III: CPT | Mod: PBBFAC,,, | Performed by: COLON & RECTAL SURGERY

## 2020-01-30 PROCEDURE — 99999 PR PBB SHADOW E&M-EST. PATIENT-LVL III: ICD-10-PCS | Mod: PBBFAC,,, | Performed by: COLON & RECTAL SURGERY

## 2020-01-30 PROCEDURE — 99213 OFFICE O/P EST LOW 20 MIN: CPT | Mod: PBBFAC | Performed by: COLON & RECTAL SURGERY

## 2020-01-30 NOTE — PROGRESS NOTES
CRS Post-operative visit    Visit Info:     Procedure: Lateral Internal Anal Spincterotomy    Date of Procedure: January 9, 2020    Indication: 68 yo M on whom I performed a screening colonoscopy in November 2018 - he had 9 polyps removed ranging in size from 4-20 mm - all but 2 of them were adenomas on pathology. He underwent a follow-up colonoscopy on 10/24/2019, at which point four additional polyps were removed, ranging in size from 4-8 mm.  Only 1 of these polyps was an adenoma.  He then underwent a TRUS with prostate biopsies by Dr Giles 10/30/19 for follow-up of elevated PSA.  He states that he developed severe constipation afterwards.  Following this, he developed severe anal pain, particularly with bowel movements.  He described the pain as sharp tearing sensation with bowel movements followed by dull aching for several hours afterwards.  He was then seen in the ER on 11/13/2019.  He was prescribed Anusol and stool softeners. He saw me 11/21/19 for further evaluation.  His constipation had resolved, but he still c/o significant pain with his bowel movements.  No bleeding.    On examination, he had a deep, chronic appearing posterior midline anal fissure.  We instituted conservative measures including topical diltiazem, warm soaks, and stool softeners and fiber supplementation.  He was instructed follow up in 6 weeks.  When he returned for follow-up, he noted very little in the way of sustained improvement, and he elected to with LIAS     Current Status:  Doing very well postop.  No pain or bleeding with bowel movements.  No problems with fecal incontinence or seepage.  He is very satisfied with the results of the procedure.    Pathology:   N/A    Physical Exam:  General: White male in NAD   Neuro: aaox4   Respiratory: resps even unlabored  Extremities: Warm dry and intact    Assessment:  Anal fissure, s/p LIAS with resolution of symptoms  History of colon polyps    Plan:  Continue high-fiber diet with stool  softeners  Avoid straining and constipation  Repeat colonoscopy in 1-2 years for polyp surveillance    Rylan Razo MD, FACS, FASCRS  Senior Staff Surgeon  Department of Colon & Rectal Surgery     This note was created using voice recognition software, and may contain some unrecognized transcriptional errors.

## 2020-01-30 NOTE — LETTER
January 30, 2020      Dustin Pete MD  [address]  [facility] [address]           [place] - Colon/Rectal Surg  [address] [facility] [address]  Phone: [phone]          Patient: Awilda Rosenthal   MR Number: [id number]   YOB: 1950   Date of Visit: 1/30/2020       Dear Dr. Pete:    Thank you for referring Awilda Rosenthal to me for evaluation. Attached you will find relevant portions of my assessment and plan of care.    If you have questions, please do not hesitate to call me. I look forward to following Awilda Rosenthal along with you.    Sincerely,    Rylan Razo MD    Enclosure  CC:  No Recipients    If you would like to receive this communication electronically, please contact [email or web address] or [phone] to request more information on [employer] Link access.    For providers and/or their staff who would like to refer a patient to [facility], please contact us through our one-stop-shop provider referral line, [facility] , at [phone].    If you feel you have received this communication in error or would no longer like to receive these types of communications, please e-mail [email or web address]

## 2020-01-31 ENCOUNTER — TELEPHONE (OUTPATIENT)
Dept: ADMINISTRATIVE | Facility: HOSPITAL | Age: 70
End: 2020-01-31

## 2020-03-03 ENCOUNTER — PATIENT OUTREACH (OUTPATIENT)
Dept: ADMINISTRATIVE | Facility: HOSPITAL | Age: 70
End: 2020-03-03

## 2020-03-03 DIAGNOSIS — E78.5 HYPERLIPIDEMIA, UNSPECIFIED HYPERLIPIDEMIA TYPE: ICD-10-CM

## 2020-03-03 DIAGNOSIS — Z11.59 NEED FOR HEPATITIS C SCREENING TEST: Primary | ICD-10-CM

## 2020-05-12 NOTE — DISCHARGE INSTRUCTIONS
High fiber diet / daily fiber supplement  8-10 glasses of water a day  Colace 100mg twice daily  Miralax 1 capful mixed in water twice daily as needed  Avoid straining / constipation    
No

## 2020-08-02 ENCOUNTER — HOSPITAL ENCOUNTER (EMERGENCY)
Facility: HOSPITAL | Age: 70
Discharge: HOME | End: 2020-08-02
Attending: SURGERY
Payer: MEDICARE

## 2020-08-02 VITALS
SYSTOLIC BLOOD PRESSURE: 132 MMHG | HEART RATE: 60 BPM | WEIGHT: 261 LBS | DIASTOLIC BLOOD PRESSURE: 82 MMHG | BODY MASS INDEX: 35.4 KG/M2 | OXYGEN SATURATION: 98 % | RESPIRATION RATE: 18 BRPM | TEMPERATURE: 96 F

## 2020-08-02 DIAGNOSIS — R10.9 RIGHT FLANK PAIN: ICD-10-CM

## 2020-08-02 DIAGNOSIS — N20.0 RENAL STONE: Primary | ICD-10-CM

## 2020-08-02 LAB
ALBUMIN SERPL BCP-MCNC: 3.8 G/DL (ref 3.5–5.2)
ALP SERPL-CCNC: 79 U/L (ref 55–135)
ALT SERPL W/O P-5'-P-CCNC: 13 U/L (ref 10–44)
ANION GAP SERPL CALC-SCNC: 12 MMOL/L (ref 8–16)
AST SERPL-CCNC: 14 U/L (ref 10–40)
BACTERIA #/AREA URNS HPF: ABNORMAL /HPF
BASOPHILS # BLD AUTO: 0.03 K/UL (ref 0–0.2)
BASOPHILS NFR BLD: 0.3 % (ref 0–1.9)
BILIRUB SERPL-MCNC: 0.7 MG/DL (ref 0.1–1)
BILIRUB UR QL STRIP: NEGATIVE
BUN SERPL-MCNC: 16 MG/DL (ref 8–23)
CALCIUM SERPL-MCNC: 8.9 MG/DL (ref 8.7–10.5)
CHLORIDE SERPL-SCNC: 101 MMOL/L (ref 95–110)
CK MB SERPL-MCNC: 1 NG/ML (ref 0.1–6.5)
CK MB SERPL-RTO: 1.1 % (ref 0–5)
CK SERPL-CCNC: 89 U/L (ref 20–200)
CK SERPL-CCNC: 89 U/L (ref 20–200)
CLARITY UR: CLEAR
CO2 SERPL-SCNC: 23 MMOL/L (ref 23–29)
COLOR UR: YELLOW
CREAT SERPL-MCNC: 1.4 MG/DL (ref 0.5–1.4)
DIFFERENTIAL METHOD: ABNORMAL
EOSINOPHIL # BLD AUTO: 0 K/UL (ref 0–0.5)
EOSINOPHIL NFR BLD: 0.1 % (ref 0–8)
ERYTHROCYTE [DISTWIDTH] IN BLOOD BY AUTOMATED COUNT: 17.4 % (ref 11.5–14.5)
EST. GFR  (AFRICAN AMERICAN): 59 ML/MIN/1.73 M^2
EST. GFR  (NON AFRICAN AMERICAN): 51 ML/MIN/1.73 M^2
GLUCOSE SERPL-MCNC: 115 MG/DL (ref 70–110)
GLUCOSE UR QL STRIP: NEGATIVE
HCT VFR BLD AUTO: 40 % (ref 40–54)
HGB BLD-MCNC: 12.6 G/DL (ref 14–18)
HGB UR QL STRIP: ABNORMAL
IMM GRANULOCYTES # BLD AUTO: 0.02 K/UL (ref 0–0.04)
IMM GRANULOCYTES NFR BLD AUTO: 0.2 % (ref 0–0.5)
KETONES UR QL STRIP: NEGATIVE
LEUKOCYTE ESTERASE UR QL STRIP: ABNORMAL
LIPASE SERPL-CCNC: 18 U/L (ref 4–60)
LYMPHOCYTES # BLD AUTO: 0.8 K/UL (ref 1–4.8)
LYMPHOCYTES NFR BLD: 8.6 % (ref 18–48)
MCH RBC QN AUTO: 26.5 PG (ref 27–31)
MCHC RBC AUTO-ENTMCNC: 31.5 G/DL (ref 32–36)
MCV RBC AUTO: 84 FL (ref 82–98)
MICROSCOPIC COMMENT: ABNORMAL
MONOCYTES # BLD AUTO: 0.4 K/UL (ref 0.3–1)
MONOCYTES NFR BLD: 4.1 % (ref 4–15)
NEUTROPHILS # BLD AUTO: 8.2 K/UL (ref 1.8–7.7)
NEUTROPHILS NFR BLD: 86.7 % (ref 38–73)
NITRITE UR QL STRIP: NEGATIVE
NRBC BLD-RTO: 0 /100 WBC
PH UR STRIP: 7 [PH] (ref 5–8)
PLATELET # BLD AUTO: 258 K/UL (ref 150–350)
PMV BLD AUTO: 10.1 FL (ref 9.2–12.9)
POTASSIUM SERPL-SCNC: 4.4 MMOL/L (ref 3.5–5.1)
PROT SERPL-MCNC: 6.9 G/DL (ref 6–8.4)
PROT UR QL STRIP: NEGATIVE
RBC # BLD AUTO: 4.76 M/UL (ref 4.6–6.2)
RBC #/AREA URNS HPF: >100 /HPF (ref 0–4)
SODIUM SERPL-SCNC: 136 MMOL/L (ref 136–145)
SP GR UR STRIP: 1.02 (ref 1–1.03)
TROPONIN I SERPL DL<=0.01 NG/ML-MCNC: <0.006 NG/ML (ref 0–0.03)
URN SPEC COLLECT METH UR: ABNORMAL
UROBILINOGEN UR STRIP-ACNC: NEGATIVE EU/DL
WBC # BLD AUTO: 9.43 K/UL (ref 3.9–12.7)
WBC #/AREA URNS HPF: 5 /HPF (ref 0–5)

## 2020-08-02 PROCEDURE — 93005 ELECTROCARDIOGRAM TRACING: CPT

## 2020-08-02 PROCEDURE — 96375 TX/PRO/DX INJ NEW DRUG ADDON: CPT

## 2020-08-02 PROCEDURE — 25000003 PHARM REV CODE 250: Performed by: SURGERY

## 2020-08-02 PROCEDURE — 96361 HYDRATE IV INFUSION ADD-ON: CPT

## 2020-08-02 PROCEDURE — 93010 EKG 12-LEAD: ICD-10-PCS | Mod: ,,, | Performed by: INTERNAL MEDICINE

## 2020-08-02 PROCEDURE — 80053 COMPREHEN METABOLIC PANEL: CPT

## 2020-08-02 PROCEDURE — 83690 ASSAY OF LIPASE: CPT

## 2020-08-02 PROCEDURE — 99285 EMERGENCY DEPT VISIT HI MDM: CPT | Mod: 25

## 2020-08-02 PROCEDURE — 81000 URINALYSIS NONAUTO W/SCOPE: CPT

## 2020-08-02 PROCEDURE — 84484 ASSAY OF TROPONIN QUANT: CPT

## 2020-08-02 PROCEDURE — 82553 CREATINE MB FRACTION: CPT

## 2020-08-02 PROCEDURE — 85025 COMPLETE CBC W/AUTO DIFF WBC: CPT

## 2020-08-02 PROCEDURE — 36415 COLL VENOUS BLD VENIPUNCTURE: CPT

## 2020-08-02 PROCEDURE — 93010 ELECTROCARDIOGRAM REPORT: CPT | Mod: ,,, | Performed by: INTERNAL MEDICINE

## 2020-08-02 PROCEDURE — 96374 THER/PROPH/DIAG INJ IV PUSH: CPT

## 2020-08-02 PROCEDURE — 63600175 PHARM REV CODE 636 W HCPCS: Performed by: SURGERY

## 2020-08-02 PROCEDURE — 82550 ASSAY OF CK (CPK): CPT

## 2020-08-02 RX ORDER — MORPHINE SULFATE 2 MG/ML
2 INJECTION, SOLUTION INTRAMUSCULAR; INTRAVENOUS
Status: DISCONTINUED | OUTPATIENT
Start: 2020-08-02 | End: 2020-08-02 | Stop reason: HOSPADM

## 2020-08-02 RX ORDER — KETOROLAC TROMETHAMINE 30 MG/ML
15 INJECTION, SOLUTION INTRAMUSCULAR; INTRAVENOUS
Status: COMPLETED | OUTPATIENT
Start: 2020-08-02 | End: 2020-08-02

## 2020-08-02 RX ORDER — CIPROFLOXACIN 500 MG/1
500 TABLET ORAL 2 TIMES DAILY
Qty: 14 TABLET | Refills: 0 | Status: SHIPPED | OUTPATIENT
Start: 2020-08-02 | End: 2020-08-09

## 2020-08-02 RX ORDER — SODIUM CHLORIDE 9 MG/ML
500 INJECTION, SOLUTION INTRAVENOUS
Status: COMPLETED | OUTPATIENT
Start: 2020-08-02 | End: 2020-08-02

## 2020-08-02 RX ORDER — SODIUM CHLORIDE 9 MG/ML
1000 INJECTION, SOLUTION INTRAVENOUS
Status: COMPLETED | OUTPATIENT
Start: 2020-08-02 | End: 2020-08-02

## 2020-08-02 RX ORDER — TAMSULOSIN HYDROCHLORIDE 0.4 MG/1
0.4 CAPSULE ORAL DAILY
Qty: 30 CAPSULE | Refills: 0 | Status: SHIPPED | OUTPATIENT
Start: 2020-08-02 | End: 2020-09-04

## 2020-08-02 RX ORDER — HYDROCODONE BITARTRATE AND ACETAMINOPHEN 5; 325 MG/1; MG/1
1 TABLET ORAL EVERY 4 HOURS PRN
Qty: 15 TABLET | Refills: 0 | Status: SHIPPED | OUTPATIENT
Start: 2020-08-02 | End: 2020-08-04

## 2020-08-02 RX ORDER — KETOROLAC TROMETHAMINE 10 MG/1
10 TABLET, FILM COATED ORAL EVERY 6 HOURS PRN
Qty: 15 TABLET | Refills: 0 | Status: SHIPPED | OUTPATIENT
Start: 2020-08-02 | End: 2021-11-22

## 2020-08-02 RX ORDER — ONDANSETRON 2 MG/ML
4 INJECTION INTRAMUSCULAR; INTRAVENOUS
Status: DISCONTINUED | OUTPATIENT
Start: 2020-08-02 | End: 2020-08-02 | Stop reason: HOSPADM

## 2020-08-02 RX ADMIN — SODIUM CHLORIDE 500 ML: 0.9 INJECTION, SOLUTION INTRAVENOUS at 01:08

## 2020-08-02 RX ADMIN — SODIUM CHLORIDE 1000 ML: 0.9 INJECTION, SOLUTION INTRAVENOUS at 02:08

## 2020-08-02 RX ADMIN — PROMETHAZINE HYDROCHLORIDE 25 MG: 25 INJECTION INTRAMUSCULAR; INTRAVENOUS at 01:08

## 2020-08-02 RX ADMIN — KETOROLAC TROMETHAMINE 15 MG: 30 INJECTION, SOLUTION INTRAMUSCULAR at 01:08

## 2020-08-02 NOTE — ED PROVIDER NOTES
[facility]                                                 Chief Complaint  69 y.o. male with Abdominal Pain (Right lower abdominal pain began at 0500)      History of Present Illness  Awilda Rosenthal presents to the emergency room with right flank pain  Immediate right flank pain at 5:00 a.m. this morning will come from sleep  Patient has a long history of kidney stones with identical presentation  Patient on exam has no obvious abdominal pain, no obvious bladder pain  Patient with hematuria last night, afebrile with good stable vital signs today    The history is provided by the patient   device was not used during this ER visit  Medical history: Prostate cancer  Surgeries: Appendectomy, TURP, colonoscopy  No known allergy    I have reviewed all of this patient's past medical, surgical, family, and social   histories as well as active allergies and medications documented in the  electronic medical record    Review of Systems and Physical Exam      Review of Systems  -- Constitution - no fever, denies fatigue, no weakness, no chills  -- Eyes - no tearing or redness, no visual disturbance  -- Ear, Nose - no tinnitus or earache, no nasal congestion or discharge  -- Mouth,Throat - no sore throat, no toothache, normal voice, normal swallowing  -- Respiratory - denies cough and congestion, no shortness of breath, no SALINAS  -- Cardiovascular - denies chest pain, no palpitations, denies claudication  -- Gastrointestinal - denies abdominal pain, nausea, vomiting, or diarrhea  -- Genitourinary - hematuria with right flank pain with kidney stone history  -- Musculoskeletal - denies back pain, negative for trauma or injury  -- Neurological - no headache, denies weakness or seizure; no LOC  -- Skin - denies pallor, rash, or changes in skin. no hives or welts noted    Vital Signs  His temperature is 98.3 °F (36.8 °C).   His blood pressure is 169/84 and his pulse is 63.   His respiration  is 16 and oxygen saturation is 96%.     Physical Exam  -- Nursing note and vitals reviewed  -- Constitutional: Appears well-developed and well-nourished  -- Head: Atraumatic. Normocephalic. No obvious abnormality  -- Eyes: Pupils are equal and reactive to light. Normal conjunctiva and lids  -- Cardiac: Normal rate, regular rhythm and normal heart sounds  -- Pulmonary: Normal respiratory effort, breath sounds clear to auscultation  -- Abdominal: Soft, no tenderness. Normal bowel sounds. Normal liver edge  -- Genitourinary: no flank pain on exam, no suprapubic pain by palpation   -- Musculoskeletal: Normal range of motion, no effusions. Joints stable   -- Neurological: No focal deficits. Showed good interaction with staff  -- Vascular: Posterior tibial, dorsalis pedis and radial pulses 2+ bilaterally      Emergency Room Course      Lab Results     K 4.4      CO2 23   BUN 16   CREATININE 1.4    (H)   ALKPHOS 79   AST 14   ALT 13   BILITOT 0.7   ALBUMIN 3.8   PROT 6.9   WBC 9.43   HGB 12.6 (L)   HCT 40.0      CPK 89   CPK 89   CPKMB 1.0   TROPONINI <0.006     EKG  -- The EKG findings today were without concerning findings from baseline     CT stone study  Right proximal ureterolithiasis resulting in mild hydronephrosis.   Right nephrolithiasis.   Enlarged prostate.   Focal hepatic finding raising possibility for an underlying mass which would be better evaluated by liver protocol CT or MRI when appropriate.     Medications Given  0.9%  NaCl infusion (has no administration in time range)   morphine injection 2 mg (has no administration in time range)   ondansetron injection 4 mg (has no administration in time range)   ketorolac injection 15 mg (15 mg Intravenous Given 8/2/20 1333)   promethazine (PHENERGAN) 25 mg in dextrose 5 % 50 mL IVPB   0.9%  NaCl infusion (500 mLs Intravenous New Bag 8/2/20 1335)     ED Physician Management  -- Diagnosis management comments: 69 y.o. male with right flank  pain  -- patient with right flank pain with kidney stone history noted today  -- CT scan today shows a 4 millimeter right-sided kidney stone tonight  -- patient's pain relieved in the ER with IV pain medication and fluids  -- patient follow up with Urology on Monday morning for outpatient tx    Diagnosis  [R10.9] Right flank pain  [N20.0] Renal stone (Primary)    Disposition and Plan  -- Disposition: home  -- Condition: stable  -- Follow-up: Patient to follow up with Dustin Pete MD in 1-2 days.  -- I advised the patient that we have found no life threatening condition today  -- At this time, I believe the patient is clinically stable for discharge.   -- The patient acknowledges that close follow up with a MD is required   -- Patient agrees to comply with all instruction and direction given in the ER    This note is dictated on M*Modal word recognition program.  There are word recognition mistakes that are occasionally missed on review.         Mike Esquivel MD  08/02/20 [id number]

## 2020-08-02 NOTE — ED TRIAGE NOTES
69 y.o. male presents to ER ED 03 /ED 03A   Chief Complaint   Patient presents with    Abdominal Pain     Right lower abdominal pain began at 0500   . No acute distress noted.

## 2020-09-04 ENCOUNTER — OFFICE VISIT (OUTPATIENT)
Dept: FAMILY MEDICINE | Facility: CLINIC | Age: 70
End: 2020-09-04
Payer: MEDICARE

## 2020-09-04 ENCOUNTER — HOSPITAL ENCOUNTER (OUTPATIENT)
Dept: RADIOLOGY | Facility: HOSPITAL | Age: 70
Discharge: HOME | End: 2020-09-04
Attending: NURSE PRACTITIONER
Payer: MEDICARE

## 2020-09-04 VITALS
WEIGHT: 250.88 LBS | HEIGHT: 72 IN | SYSTOLIC BLOOD PRESSURE: 128 MMHG | BODY MASS INDEX: 33.98 KG/M2 | RESPIRATION RATE: 14 BRPM | TEMPERATURE: 97 F | DIASTOLIC BLOOD PRESSURE: 72 MMHG | HEART RATE: 86 BPM

## 2020-09-04 DIAGNOSIS — N20.0 KIDNEY STONES: ICD-10-CM

## 2020-09-04 DIAGNOSIS — N13.30 HYDRONEPHROSIS, UNSPECIFIED HYDRONEPHROSIS TYPE: ICD-10-CM

## 2020-09-04 DIAGNOSIS — R82.90 ABNORMAL URINALYSIS: ICD-10-CM

## 2020-09-04 DIAGNOSIS — R10.9 FLANK PAIN: Primary | ICD-10-CM

## 2020-09-04 DIAGNOSIS — N28.9 FUNCTION KIDNEY DECREASED: ICD-10-CM

## 2020-09-04 DIAGNOSIS — R10.9 FLANK PAIN: ICD-10-CM

## 2020-09-04 PROCEDURE — 99213 OFFICE O/P EST LOW 20 MIN: CPT | Mod: PBBFAC,25 | Performed by: NURSE PRACTITIONER

## 2020-09-04 PROCEDURE — 99999 PR PBB SHADOW E&M-EST. PATIENT-LVL III: CPT | Mod: PBBFAC,,, | Performed by: NURSE PRACTITIONER

## 2020-09-04 PROCEDURE — 99999 PR STA SHADOW: CPT | Mod: PBBFAC,,, | Performed by: NURSE PRACTITIONER

## 2020-09-04 PROCEDURE — 74176 CT ABD & PELVIS W/O CONTRAST: CPT | Mod: TC

## 2020-09-04 PROCEDURE — 74176 CT RENAL STONE STUDY ABD PELVIS WO: ICD-10-PCS | Mod: 26,,, | Performed by: RADIOLOGY

## 2020-09-04 PROCEDURE — 99999 PR STA SHADOW: ICD-10-PCS | Mod: PBBFAC,,, | Performed by: NURSE PRACTITIONER

## 2020-09-04 PROCEDURE — 99214 OFFICE O/P EST MOD 30 MIN: CPT | Mod: S$PBB | Performed by: NURSE PRACTITIONER

## 2020-09-04 PROCEDURE — 74176 CT ABD & PELVIS W/O CONTRAST: CPT | Mod: 26,,, | Performed by: RADIOLOGY

## 2020-09-04 NOTE — PROGRESS NOTES
Subjective:       Patient ID: Awilda Rosenthal is a 69 y.o. male.    Chief Complaint: Flank Pain (Right side) and Nephrolithiasis    Here for ER recheck. Had kidney stone with hydronephrosis. Needs to have questions answered. Hooks is out until October. No pain at this time.     Flank Pain      Review of Systems   Constitutional: Negative.    HENT: Negative.    Eyes: Negative.    Respiratory: Negative.    Cardiovascular: Negative.    Gastrointestinal: Negative.    Genitourinary: Positive for flank pain.   Skin: Negative.    Neurological: Negative.    Psychiatric/Behavioral: Negative.    All other systems reviewed and are negative.      Objective:      Physical Exam  Vitals signs and nursing note reviewed.   Constitutional:       General: He is not in acute distress.     Appearance: He is well-developed.   HENT:      Head: Normocephalic and atraumatic.   Eyes:      Pupils: Pupils are equal, round, and reactive to light.   Neck:      Musculoskeletal: Normal range of motion and neck supple.   Cardiovascular:      Rate and Rhythm: Normal rate and regular rhythm.      Pulses: Normal pulses.      Heart sounds: Normal heart sounds. No murmur.   Pulmonary:      Effort: Pulmonary effort is normal. No respiratory distress.      Breath sounds: Normal breath sounds.   Musculoskeletal: Normal range of motion.   Skin:     General: Skin is warm and dry.   Neurological:      Mental Status: He is alert and oriented to person, place, and time.   Psychiatric:         Mood and Affect: Mood normal.         Assessment:       1. Flank pain    2. Kidney stones    3. Hydronephrosis, unspecified hydronephrosis type    4. Function kidney decreased    5. Abnormal urinalysis        Plan:   Awilda was seen today for flank pain and nephrolithiasis.    Diagnoses and all orders for this visit:    Flank pain  -     CT Renal Stone Study ABD Pelvis WO; Future    Kidney stones  -     CT Renal Stone Study ABD Pelvis WO; Future  -     URINALYSIS;  Future    Hydronephrosis, unspecified hydronephrosis type  -     CT Renal Stone Study ABD Pelvis WO; Future    Function kidney decreased  -     Basic metabolic panel; Future    Abnormal urinalysis  -     Urine culture; Future    Spoke to patient with results - will get him appointment with Dr. Hooks - follow up with Dr. Pete

## 2020-10-13 ENCOUNTER — OFFICE VISIT (OUTPATIENT)
Dept: FAMILY MEDICINE | Facility: CLINIC | Age: 70
End: 2020-10-13
Payer: MEDICARE

## 2020-10-13 VITALS
RESPIRATION RATE: 16 BRPM | HEIGHT: 72 IN | BODY MASS INDEX: 34.32 KG/M2 | TEMPERATURE: 98 F | HEART RATE: 88 BPM | DIASTOLIC BLOOD PRESSURE: 70 MMHG | SYSTOLIC BLOOD PRESSURE: 118 MMHG | WEIGHT: 253.38 LBS

## 2020-10-13 DIAGNOSIS — D36.9 TUBULAR ADENOMA: Primary | ICD-10-CM

## 2020-10-13 DIAGNOSIS — N20.0 KIDNEY STONES: ICD-10-CM

## 2020-10-13 PROCEDURE — 99213 OFFICE O/P EST LOW 20 MIN: CPT | Mod: PBBFAC | Performed by: FAMILY MEDICINE

## 2020-10-13 PROCEDURE — 99999 PR STA SHADOW: ICD-10-PCS | Mod: PBBFAC,,, | Performed by: FAMILY MEDICINE

## 2020-10-13 PROCEDURE — 99999 PR STA SHADOW: CPT | Mod: PBBFAC,,, | Performed by: FAMILY MEDICINE

## 2020-10-13 PROCEDURE — 99999 PR PBB SHADOW E&M-EST. PATIENT-LVL III: CPT | Mod: PBBFAC,,, | Performed by: FAMILY MEDICINE

## 2020-10-13 PROCEDURE — 99213 OFFICE O/P EST LOW 20 MIN: CPT | Mod: S$PBB | Performed by: FAMILY MEDICINE

## 2020-10-13 RX ORDER — TAMSULOSIN HYDROCHLORIDE 0.4 MG/1
CAPSULE ORAL
Qty: 90 CAPSULE | Refills: 3 | Status: SHIPPED | OUTPATIENT
Start: 2020-10-13 | End: 2021-03-02 | Stop reason: SDUPTHER

## 2020-10-13 RX ORDER — FINASTERIDE 5 MG/1
TABLET, FILM COATED ORAL
Qty: 30 TABLET | Refills: 11 | Status: SHIPPED | OUTPATIENT
Start: 2020-10-13 | End: 2021-05-21 | Stop reason: SDUPTHER

## 2020-10-13 NOTE — PROGRESS NOTES
Subjective:       Patient ID: Awilda Rosenthal is a 69 y.o. male.    Chief Complaint: Follow-up    Pt is a 69 y.o. male who presents for check up for R renal colic 5/10 and past hx of a tubular adenoma  (primary encounter diagnosis)  Kidney stones. Doing well on current meds. Denies any side effects. Prevention is up to date.    Review of Systems   Constitutional: Negative for appetite change.   HENT: Negative for congestion, ear pain, sneezing and sore throat.    Eyes: Negative for redness and visual disturbance.   Respiratory: Negative for cough, chest tightness and stridor.    Cardiovascular: Negative for chest pain.   Gastrointestinal: Negative for abdominal pain, blood in stool, diarrhea, nausea and vomiting.   Genitourinary: Negative for difficulty urinating, dysuria and hematuria.        R renal colic   Musculoskeletal: Negative for arthralgias, back pain, joint swelling, myalgias and neck pain.   Skin: Negative for rash.   Neurological: Negative for dizziness.   Psychiatric/Behavioral: Negative for agitation. The patient is not nervous/anxious.        Objective:      Physical Exam  Constitutional:       Appearance: He is well-developed. He is obese.      Comments: 68 y/o W M   HENT:      Head: Normocephalic.   Eyes:      Pupils: Pupils are equal, round, and reactive to light.   Neck:      Musculoskeletal: Normal range of motion and neck supple.      Thyroid: No thyromegaly.   Cardiovascular:      Rate and Rhythm: Normal rate and regular rhythm.      Heart sounds: No murmur. No friction rub.   Pulmonary:      Effort: Pulmonary effort is normal. No respiratory distress.      Breath sounds: No wheezing.   Abdominal:      Tenderness: There is no abdominal tenderness. There is no guarding or rebound.   Musculoskeletal: Normal range of motion.         General: No tenderness.   Lymphadenopathy:      Cervical: No cervical adenopathy.   Skin:     General: Skin is warm and dry.   Neurological:      Mental Status: He  is alert and oriented to person, place, and time.      Cranial Nerves: No cranial nerve deficit.      Deep Tendon Reflexes: Reflexes are normal and symmetric.   Psychiatric:         Thought Content: Thought content normal.         Judgment: Judgment normal.         Assessment:       1. Tubular adenoma    2. Kidney stones        Plan:   To Dr Johnson for eval

## 2020-10-15 PROBLEM — N13.2 URETERAL STONE WITH HYDRONEPHROSIS: Status: ACTIVE | Noted: 2020-10-15

## 2021-02-25 ENCOUNTER — PES CALL (OUTPATIENT)
Dept: ADMINISTRATIVE | Facility: CLINIC | Age: 71
End: 2021-02-25

## 2021-03-02 ENCOUNTER — OFFICE VISIT (OUTPATIENT)
Dept: FAMILY MEDICINE | Facility: CLINIC | Age: 71
End: 2021-03-02
Payer: MEDICARE

## 2021-03-02 VITALS
TEMPERATURE: 97 F | DIASTOLIC BLOOD PRESSURE: 88 MMHG | RESPIRATION RATE: 12 BRPM | HEIGHT: 72 IN | SYSTOLIC BLOOD PRESSURE: 140 MMHG | BODY MASS INDEX: 34.67 KG/M2 | WEIGHT: 255.94 LBS | HEART RATE: 76 BPM

## 2021-03-02 DIAGNOSIS — K21.00 GASTROESOPHAGEAL REFLUX DISEASE WITH ESOPHAGITIS WITHOUT HEMORRHAGE: ICD-10-CM

## 2021-03-02 DIAGNOSIS — N47.1 PHIMOSIS: ICD-10-CM

## 2021-03-02 DIAGNOSIS — Z74.09 IMPAIRED MOBILITY: ICD-10-CM

## 2021-03-02 DIAGNOSIS — N52.9 MALE ERECTILE DISORDER: Primary | ICD-10-CM

## 2021-03-02 PROCEDURE — 99999 PR STA SHADOW: ICD-10-PCS | Mod: PBBFAC,,, | Performed by: FAMILY MEDICINE

## 2021-03-02 PROCEDURE — 99213 OFFICE O/P EST LOW 20 MIN: CPT | Mod: S$PBB | Performed by: FAMILY MEDICINE

## 2021-03-02 PROCEDURE — 99213 OFFICE O/P EST LOW 20 MIN: CPT | Mod: PBBFAC | Performed by: FAMILY MEDICINE

## 2021-03-02 PROCEDURE — 99999 PR PBB SHADOW E&M-EST. PATIENT-LVL III: CPT | Mod: PBBFAC,,, | Performed by: FAMILY MEDICINE

## 2021-03-02 PROCEDURE — 99999 PR STA SHADOW: CPT | Mod: PBBFAC,,, | Performed by: FAMILY MEDICINE

## 2021-03-02 RX ORDER — RABEPRAZOLE SODIUM 20 MG/1
20 TABLET, DELAYED RELEASE ORAL DAILY
Qty: 30 TABLET | Refills: 5 | Status: SHIPPED | OUTPATIENT
Start: 2021-03-02 | End: 2021-05-17 | Stop reason: SDUPTHER

## 2021-03-02 RX ORDER — SUCRALFATE 1 G/1
TABLET ORAL
Qty: 120 TABLET | Refills: 1 | Status: SHIPPED | OUTPATIENT
Start: 2021-03-02 | End: 2021-05-17 | Stop reason: SDUPTHER

## 2021-03-02 RX ORDER — TADALAFIL 20 MG/1
TABLET ORAL
Qty: 15 TABLET | Refills: 5 | Status: ON HOLD | OUTPATIENT
Start: 2021-03-02 | End: 2021-11-23

## 2021-03-02 RX ORDER — TAMSULOSIN HYDROCHLORIDE 0.4 MG/1
CAPSULE ORAL
Qty: 90 CAPSULE | Refills: 3 | Status: ON HOLD | OUTPATIENT
Start: 2021-03-02 | End: 2021-11-23

## 2021-03-02 RX ORDER — CLOTRIMAZOLE AND BETAMETHASONE DIPROPIONATE 10; .64 MG/G; MG/G
CREAM TOPICAL
Qty: 45 G | Refills: 3 | Status: SHIPPED | OUTPATIENT
Start: 2021-03-02

## 2021-03-05 ENCOUNTER — TELEPHONE (OUTPATIENT)
Dept: FAMILY MEDICINE | Facility: CLINIC | Age: 71
End: 2021-03-05

## 2021-03-16 ENCOUNTER — OFFICE VISIT (OUTPATIENT)
Dept: FAMILY MEDICINE | Facility: CLINIC | Age: 71
End: 2021-03-16
Payer: MEDICARE

## 2021-03-16 VITALS
DIASTOLIC BLOOD PRESSURE: 86 MMHG | HEIGHT: 72 IN | RESPIRATION RATE: 16 BRPM | WEIGHT: 252.44 LBS | TEMPERATURE: 97 F | BODY MASS INDEX: 34.19 KG/M2 | HEART RATE: 88 BPM | SYSTOLIC BLOOD PRESSURE: 140 MMHG

## 2021-03-16 DIAGNOSIS — N50.812 PAIN IN LEFT TESTICLE: Primary | ICD-10-CM

## 2021-03-16 DIAGNOSIS — N45.2 ORCHIDITIS: ICD-10-CM

## 2021-03-16 PROCEDURE — 99214 OFFICE O/P EST MOD 30 MIN: CPT | Mod: S$PBB | Performed by: FAMILY MEDICINE

## 2021-03-16 PROCEDURE — 99999 PR STA SHADOW: CPT | Mod: PBBFAC,,,

## 2021-03-16 PROCEDURE — 99999 PR PBB SHADOW E&M-EST. PATIENT-LVL III: ICD-10-PCS | Mod: PBBFAC,,, | Performed by: FAMILY MEDICINE

## 2021-03-16 PROCEDURE — 99999 PR PBB SHADOW E&M-EST. PATIENT-LVL III: CPT | Mod: PBBFAC,,, | Performed by: FAMILY MEDICINE

## 2021-03-16 PROCEDURE — 99999 PR STA SHADOW: ICD-10-PCS | Mod: PBBFAC,,,

## 2021-03-16 PROCEDURE — 96372 THER/PROPH/DIAG INJ SC/IM: CPT | Mod: PBBFAC

## 2021-03-16 PROCEDURE — 99999 PR STA SHADOW: CPT | Mod: PBBFAC,,, | Performed by: FAMILY MEDICINE

## 2021-03-16 PROCEDURE — 99213 OFFICE O/P EST LOW 20 MIN: CPT | Mod: PBBFAC,25 | Performed by: FAMILY MEDICINE

## 2021-03-16 RX ORDER — DICLOFENAC SODIUM 75 MG/1
75 TABLET, DELAYED RELEASE ORAL 2 TIMES DAILY
Qty: 60 TABLET | Refills: 3 | Status: ON HOLD | OUTPATIENT
Start: 2021-03-16 | End: 2021-11-23

## 2021-03-16 RX ORDER — KETOROLAC TROMETHAMINE 30 MG/ML
30 INJECTION, SOLUTION INTRAMUSCULAR; INTRAVENOUS
Status: COMPLETED | OUTPATIENT
Start: 2021-03-16 | End: 2021-03-16

## 2021-03-16 RX ORDER — SULFAMETHOXAZOLE AND TRIMETHOPRIM 800; 160 MG/1; MG/1
1 TABLET ORAL 2 TIMES DAILY
Qty: 20 TABLET | Refills: 0 | Status: SHIPPED | OUTPATIENT
Start: 2021-03-16 | End: 2021-11-22

## 2021-03-16 RX ADMIN — KETOROLAC TROMETHAMINE 30 MG: 30 INJECTION, SOLUTION INTRAMUSCULAR at 10:03

## 2021-04-05 ENCOUNTER — TELEPHONE (OUTPATIENT)
Dept: FAMILY MEDICINE | Facility: CLINIC | Age: 71
End: 2021-04-05

## 2021-04-06 ENCOUNTER — OFFICE VISIT (OUTPATIENT)
Dept: FAMILY MEDICINE | Facility: CLINIC | Age: 71
End: 2021-04-06
Payer: MEDICARE

## 2021-04-06 ENCOUNTER — HOSPITAL ENCOUNTER (OUTPATIENT)
Dept: RADIOLOGY | Facility: HOSPITAL | Age: 71
Discharge: HOME | End: 2021-04-06
Attending: FAMILY MEDICINE
Payer: MEDICARE

## 2021-04-06 VITALS
SYSTOLIC BLOOD PRESSURE: 130 MMHG | BODY MASS INDEX: 34.39 KG/M2 | TEMPERATURE: 99 F | DIASTOLIC BLOOD PRESSURE: 84 MMHG | RESPIRATION RATE: 16 BRPM | HEIGHT: 72 IN | WEIGHT: 253.88 LBS | HEART RATE: 84 BPM

## 2021-04-06 DIAGNOSIS — N45.2 ORCHIDITIS: ICD-10-CM

## 2021-04-06 DIAGNOSIS — N45.2 ORCHIDITIS: Primary | ICD-10-CM

## 2021-04-06 DIAGNOSIS — N43.3 HYDROCELE, BILATERAL: Primary | ICD-10-CM

## 2021-04-06 PROCEDURE — 76870 US EXAM SCROTUM: CPT | Mod: TC

## 2021-04-06 PROCEDURE — 99999 PR STA SHADOW: CPT | Mod: PBBFAC,,, | Performed by: FAMILY MEDICINE

## 2021-04-06 PROCEDURE — 99999 PR PBB SHADOW E&M-EST. PATIENT-LVL III: CPT | Mod: PBBFAC,,, | Performed by: FAMILY MEDICINE

## 2021-04-06 PROCEDURE — 99213 OFFICE O/P EST LOW 20 MIN: CPT | Mod: S$PBB | Performed by: FAMILY MEDICINE

## 2021-04-06 PROCEDURE — 76870 US SCROTUM AND TESTICLES: ICD-10-PCS | Mod: 26,,, | Performed by: RADIOLOGY

## 2021-04-06 PROCEDURE — 76870 US EXAM SCROTUM: CPT | Mod: 26,,, | Performed by: RADIOLOGY

## 2021-04-06 PROCEDURE — 99999 PR PBB SHADOW E&M-EST. PATIENT-LVL III: ICD-10-PCS | Mod: PBBFAC,,, | Performed by: FAMILY MEDICINE

## 2021-04-06 PROCEDURE — 99213 OFFICE O/P EST LOW 20 MIN: CPT | Mod: PBBFAC,25 | Performed by: FAMILY MEDICINE

## 2021-04-07 ENCOUNTER — TELEPHONE (OUTPATIENT)
Dept: FAMILY MEDICINE | Facility: CLINIC | Age: 71
End: 2021-04-07

## 2021-04-13 ENCOUNTER — OFFICE VISIT (OUTPATIENT)
Dept: SURGERY | Facility: CLINIC | Age: 71
End: 2021-04-13
Payer: MEDICARE

## 2021-04-13 ENCOUNTER — PATIENT OUTREACH (OUTPATIENT)
Dept: ADMINISTRATIVE | Facility: OTHER | Age: 71
End: 2021-04-13

## 2021-04-13 ENCOUNTER — TELEPHONE (OUTPATIENT)
Dept: FAMILY MEDICINE | Facility: CLINIC | Age: 71
End: 2021-04-13

## 2021-04-13 VITALS
WEIGHT: 252.88 LBS | OXYGEN SATURATION: 98 % | RESPIRATION RATE: 20 BRPM | BODY MASS INDEX: 34.25 KG/M2 | HEIGHT: 72 IN | HEART RATE: 97 BPM | DIASTOLIC BLOOD PRESSURE: 70 MMHG | SYSTOLIC BLOOD PRESSURE: 110 MMHG

## 2021-04-13 DIAGNOSIS — I86.1 VARICOCELE PRESENT ON ULTRASOUND OF SCROTUM: ICD-10-CM

## 2021-04-13 DIAGNOSIS — R10.30 INGUINAL PAIN, UNSPECIFIED LATERALITY: Primary | ICD-10-CM

## 2021-04-13 PROCEDURE — 99999 PR STA SHADOW: CPT | Mod: PBBFAC,,, | Performed by: SURGERY

## 2021-04-13 PROCEDURE — 99213 OFFICE O/P EST LOW 20 MIN: CPT | Mod: PBBFAC | Performed by: SURGERY

## 2021-04-13 PROCEDURE — 99204 OFFICE O/P NEW MOD 45 MIN: CPT | Mod: S$PBB | Performed by: SURGERY

## 2021-04-13 PROCEDURE — 99999 PR PBB SHADOW E&M-EST. PATIENT-LVL III: CPT | Mod: PBBFAC,,, | Performed by: SURGERY

## 2021-04-13 PROCEDURE — 99999 PR PBB SHADOW E&M-EST. PATIENT-LVL III: ICD-10-PCS | Mod: PBBFAC,,, | Performed by: SURGERY

## 2021-04-19 ENCOUNTER — OFFICE VISIT (OUTPATIENT)
Dept: UROLOGY | Facility: CLINIC | Age: 71
End: 2021-04-19
Payer: MEDICARE

## 2021-04-19 VITALS
BODY MASS INDEX: 33.95 KG/M2 | DIASTOLIC BLOOD PRESSURE: 87 MMHG | WEIGHT: 250.69 LBS | HEIGHT: 72 IN | SYSTOLIC BLOOD PRESSURE: 142 MMHG | HEART RATE: 90 BPM

## 2021-04-19 DIAGNOSIS — R10.30 INGUINAL PAIN, UNSPECIFIED LATERALITY: ICD-10-CM

## 2021-04-19 DIAGNOSIS — N50.812 PAIN IN LEFT TESTICLE: Primary | ICD-10-CM

## 2021-04-19 DIAGNOSIS — R10.9 ABDOMINAL PAIN, UNSPECIFIED ABDOMINAL LOCATION: ICD-10-CM

## 2021-04-19 PROCEDURE — 99999 PR PBB SHADOW E&M-EST. PATIENT-LVL III: ICD-10-PCS | Mod: PBBFAC,,, | Performed by: UROLOGY

## 2021-04-19 PROCEDURE — 99213 OFFICE O/P EST LOW 20 MIN: CPT | Mod: PBBFAC | Performed by: UROLOGY

## 2021-04-19 PROCEDURE — 99214 OFFICE O/P EST MOD 30 MIN: CPT | Mod: S$PBB,,, | Performed by: UROLOGY

## 2021-04-19 PROCEDURE — 99214 PR OFFICE/OUTPT VISIT, EST, LEVL IV, 30-39 MIN: ICD-10-PCS | Mod: S$PBB,,, | Performed by: UROLOGY

## 2021-04-19 PROCEDURE — 99999 PR PBB SHADOW E&M-EST. PATIENT-LVL III: CPT | Mod: PBBFAC,,, | Performed by: UROLOGY

## 2021-04-20 ENCOUNTER — HOSPITAL ENCOUNTER (OUTPATIENT)
Dept: RADIOLOGY | Facility: HOSPITAL | Age: 71
Discharge: HOME | End: 2021-04-20
Attending: STUDENT IN AN ORGANIZED HEALTH CARE EDUCATION/TRAINING PROGRAM
Payer: MEDICARE

## 2021-04-20 DIAGNOSIS — R10.9 ABDOMINAL PAIN, UNSPECIFIED ABDOMINAL LOCATION: ICD-10-CM

## 2021-04-20 PROCEDURE — 74176 CT ABD & PELVIS W/O CONTRAST: CPT | Mod: 26,,, | Performed by: RADIOLOGY

## 2021-04-20 PROCEDURE — 74176 CT ABD & PELVIS W/O CONTRAST: CPT | Mod: TC

## 2021-04-20 PROCEDURE — 74176 CT RENAL STONE STUDY ABD PELVIS WO: ICD-10-PCS | Mod: 26,,, | Performed by: RADIOLOGY

## 2021-04-22 ENCOUNTER — TELEPHONE (OUTPATIENT)
Dept: UROLOGY | Facility: CLINIC | Age: 71
End: 2021-04-22

## 2021-04-22 DIAGNOSIS — R16.0 HYPODENSE MASS OF LIVER: Primary | ICD-10-CM

## 2021-04-29 ENCOUNTER — OFFICE VISIT (OUTPATIENT)
Dept: FAMILY MEDICINE | Facility: CLINIC | Age: 71
End: 2021-04-29
Payer: MEDICARE

## 2021-04-29 VITALS
TEMPERATURE: 97 F | HEIGHT: 72 IN | RESPIRATION RATE: 16 BRPM | BODY MASS INDEX: 34.4 KG/M2 | HEART RATE: 92 BPM | WEIGHT: 254 LBS | DIASTOLIC BLOOD PRESSURE: 62 MMHG | SYSTOLIC BLOOD PRESSURE: 118 MMHG

## 2021-04-29 DIAGNOSIS — R16.0 LIVER MASS: Primary | ICD-10-CM

## 2021-04-29 DIAGNOSIS — R10.9 FLANK PAIN: ICD-10-CM

## 2021-04-29 DIAGNOSIS — E78.5 HYPERLIPIDEMIA, UNSPECIFIED HYPERLIPIDEMIA TYPE: ICD-10-CM

## 2021-04-29 PROCEDURE — 99213 OFFICE O/P EST LOW 20 MIN: CPT | Mod: PBBFAC | Performed by: FAMILY MEDICINE

## 2021-04-29 PROCEDURE — 99213 OFFICE O/P EST LOW 20 MIN: CPT | Mod: S$PBB | Performed by: FAMILY MEDICINE

## 2021-04-29 PROCEDURE — 99999 PR PBB SHADOW E&M-EST. PATIENT-LVL III: CPT | Mod: PBBFAC,,, | Performed by: FAMILY MEDICINE

## 2021-04-29 PROCEDURE — 99999 PR STA SHADOW: CPT | Mod: PBBFAC,,, | Performed by: FAMILY MEDICINE

## 2021-04-29 PROCEDURE — 99999 PR STA SHADOW: ICD-10-PCS | Mod: PBBFAC,,, | Performed by: FAMILY MEDICINE

## 2021-05-07 ENCOUNTER — HOSPITAL ENCOUNTER (OUTPATIENT)
Dept: RADIOLOGY | Facility: HOSPITAL | Age: 71
Discharge: HOME | End: 2021-05-07
Attending: FAMILY MEDICINE
Payer: MEDICARE

## 2021-05-07 DIAGNOSIS — R16.0 HYPODENSE MASS OF LIVER: ICD-10-CM

## 2021-05-07 PROCEDURE — A9585 GADOBUTROL INJECTION: HCPCS | Performed by: FAMILY MEDICINE

## 2021-05-07 PROCEDURE — 74183 MRI ABD W/O CNTR FLWD CNTR: CPT | Mod: 26,,, | Performed by: RADIOLOGY

## 2021-05-07 PROCEDURE — 25500020 PHARM REV CODE 255: Performed by: FAMILY MEDICINE

## 2021-05-07 PROCEDURE — 74183 MRI ABDOMEN W WO CONTRAST: ICD-10-PCS | Mod: 26,,, | Performed by: RADIOLOGY

## 2021-05-07 PROCEDURE — 74183 MRI ABD W/O CNTR FLWD CNTR: CPT | Mod: TC

## 2021-05-07 RX ORDER — GADOBUTROL 604.72 MG/ML
10 INJECTION INTRAVENOUS
Status: COMPLETED | OUTPATIENT
Start: 2021-05-07 | End: 2021-05-07

## 2021-05-07 RX ADMIN — GADOBUTROL 10 ML: 604.72 INJECTION INTRAVENOUS at 10:05

## 2021-05-17 ENCOUNTER — OFFICE VISIT (OUTPATIENT)
Dept: FAMILY MEDICINE | Facility: CLINIC | Age: 71
End: 2021-05-17
Payer: MEDICARE

## 2021-05-17 ENCOUNTER — TELEPHONE (OUTPATIENT)
Dept: FAMILY MEDICINE | Facility: CLINIC | Age: 71
End: 2021-05-17

## 2021-05-17 VITALS
HEART RATE: 88 BPM | SYSTOLIC BLOOD PRESSURE: 120 MMHG | RESPIRATION RATE: 16 BRPM | BODY MASS INDEX: 34.43 KG/M2 | TEMPERATURE: 97 F | WEIGHT: 254.19 LBS | HEIGHT: 72 IN | DIASTOLIC BLOOD PRESSURE: 70 MMHG

## 2021-05-17 DIAGNOSIS — D18.03 HEMANGIOMA OF INTRA-ABDOMINAL STRUCTURE: Primary | ICD-10-CM

## 2021-05-17 PROCEDURE — 99999 PR STA SHADOW: ICD-10-PCS | Mod: PBBFAC,,, | Performed by: FAMILY MEDICINE

## 2021-05-17 PROCEDURE — 99999 PR STA SHADOW: CPT | Mod: PBBFAC,,, | Performed by: FAMILY MEDICINE

## 2021-05-17 PROCEDURE — 99213 OFFICE O/P EST LOW 20 MIN: CPT | Mod: S$PBB | Performed by: FAMILY MEDICINE

## 2021-05-17 PROCEDURE — 99999 PR PBB SHADOW E&M-EST. PATIENT-LVL III: CPT | Mod: PBBFAC,,, | Performed by: FAMILY MEDICINE

## 2021-05-17 PROCEDURE — 99213 OFFICE O/P EST LOW 20 MIN: CPT | Mod: PBBFAC | Performed by: FAMILY MEDICINE

## 2021-05-17 RX ORDER — SUCRALFATE 1 G/1
TABLET ORAL
Qty: 360 TABLET | Refills: 3 | Status: ON HOLD | OUTPATIENT
Start: 2021-05-17 | End: 2021-11-23

## 2021-05-17 RX ORDER — RABEPRAZOLE SODIUM 20 MG/1
20 TABLET, DELAYED RELEASE ORAL DAILY
Qty: 90 TABLET | Refills: 3 | Status: SHIPPED | OUTPATIENT
Start: 2021-05-17 | End: 2021-11-16 | Stop reason: SDUPTHER

## 2021-05-20 ENCOUNTER — TELEPHONE (OUTPATIENT)
Dept: FAMILY MEDICINE | Facility: CLINIC | Age: 71
End: 2021-05-20

## 2021-05-21 ENCOUNTER — TELEPHONE (OUTPATIENT)
Dept: FAMILY MEDICINE | Facility: CLINIC | Age: 71
End: 2021-05-21

## 2021-05-21 RX ORDER — FINASTERIDE 5 MG/1
TABLET, FILM COATED ORAL
Qty: 30 TABLET | Refills: 11 | Status: SHIPPED | OUTPATIENT
Start: 2021-05-21 | End: 2021-11-16 | Stop reason: SDUPTHER

## 2021-07-28 ENCOUNTER — PATIENT OUTREACH (OUTPATIENT)
Dept: ADMINISTRATIVE | Facility: HOSPITAL | Age: 71
End: 2021-07-28

## 2021-07-30 ENCOUNTER — CLINICAL SUPPORT (OUTPATIENT)
Dept: FAMILY MEDICINE | Facility: CLINIC | Age: 71
End: 2021-07-30
Payer: MEDICARE

## 2021-07-30 DIAGNOSIS — E78.5 HYPERLIPIDEMIA, UNSPECIFIED HYPERLIPIDEMIA TYPE: ICD-10-CM

## 2021-07-30 LAB
ALBUMIN SERPL BCP-MCNC: 3.6 G/DL (ref 3.5–5.2)
ALP SERPL-CCNC: 80 U/L (ref 55–135)
ALT SERPL W/O P-5'-P-CCNC: 11 U/L (ref 10–44)
ANION GAP SERPL CALC-SCNC: 10 MMOL/L (ref 8–16)
AST SERPL-CCNC: 13 U/L (ref 10–40)
BILIRUB SERPL-MCNC: 0.7 MG/DL (ref 0.1–1)
BUN SERPL-MCNC: 16 MG/DL (ref 8–23)
CALCIUM SERPL-MCNC: 9.2 MG/DL (ref 8.7–10.5)
CHLORIDE SERPL-SCNC: 110 MMOL/L (ref 95–110)
CHOLEST SERPL-MCNC: 215 MG/DL (ref 120–199)
CHOLEST/HDLC SERPL: 5.8 {RATIO} (ref 2–5)
CO2 SERPL-SCNC: 22 MMOL/L (ref 23–29)
CREAT SERPL-MCNC: 1.2 MG/DL (ref 0.5–1.4)
EST. GFR  (AFRICAN AMERICAN): >60 ML/MIN/1.73 M^2
EST. GFR  (NON AFRICAN AMERICAN): >60 ML/MIN/1.73 M^2
GLUCOSE SERPL-MCNC: 97 MG/DL (ref 70–110)
HDLC SERPL-MCNC: 37 MG/DL (ref 40–75)
HDLC SERPL: 17.2 % (ref 20–50)
LDLC SERPL CALC-MCNC: 132.4 MG/DL (ref 63–159)
NONHDLC SERPL-MCNC: 178 MG/DL
POTASSIUM SERPL-SCNC: 4.2 MMOL/L (ref 3.5–5.1)
PROT SERPL-MCNC: 6.9 G/DL (ref 6–8.4)
SODIUM SERPL-SCNC: 142 MMOL/L (ref 136–145)
TRIGL SERPL-MCNC: 228 MG/DL (ref 30–150)

## 2021-07-30 PROCEDURE — 80053 COMPREHEN METABOLIC PANEL: CPT | Performed by: FAMILY MEDICINE

## 2021-07-30 PROCEDURE — 99999 PR STA SHADOW: ICD-10-PCS | Mod: PBBFAC,,, | Performed by: FAMILY MEDICINE

## 2021-07-30 PROCEDURE — 36415 COLL VENOUS BLD VENIPUNCTURE: CPT | Mod: PBBFAC

## 2021-07-30 PROCEDURE — 80061 LIPID PANEL: CPT | Performed by: FAMILY MEDICINE

## 2021-07-30 PROCEDURE — 99999 PR STA SHADOW: CPT | Mod: PBBFAC,,, | Performed by: FAMILY MEDICINE

## 2021-10-25 NOTE — ANESTHESIA PREPROCEDURE EVALUATION
10/25/2018  Awilda Rosenthal is a 68 y.o., male.    Anesthesia Evaluation    I have reviewed the Patient Summary Reports.    I have reviewed the Nursing Notes.   I have reviewed the Medications.     Review of Systems  Anesthesia Hx:  No problems with previous Anesthesia    Social:  Non-Smoker, No Alcohol Use    Hematology/Oncology:  Hematology Normal   Oncology Normal     EENT/Dental:EENT/Dental Normal   Cardiovascular:  Cardiovascular Normal Exercise tolerance: good     Pulmonary:  Pulmonary Normal    Renal/:  Renal/ Normal     Hepatic/GI:  Hepatic/GI Normal    Musculoskeletal:  Musculoskeletal Normal    Neurological:  Neurology Normal    Endocrine:  Endocrine Normal    Dermatological:  Skin Normal    Psych:  Psychiatric Normal           Physical Exam  General:  Obesity    Airway/Jaw/Neck:  Airway Findings: Mouth Opening: Normal Tongue: Normal  General Airway Assessment: Adult  Mallampati: II  TM Distance: Normal, at least 6 cm      Dental:  Dental Findings: upper partial dentures             Anesthesia Plan  Type of Anesthesia, risks & benefits discussed:  Anesthesia Type:  general  Patient's Preference:   Intra-op Monitoring Plan: standard ASA monitors  Intra-op Monitoring Plan Comments:   Post Op Pain Control Plan:   Post Op Pain Control Plan Comments:   Induction:   IV  Beta Blocker:  Patient is not currently on a Beta-Blocker (No further documentation required).       Informed Consent: Patient understands risks and agrees with Anesthesia plan.  Questions answered. Anesthesia consent signed with patient.  ASA Score: 2     Day of Surgery Review of History & Physical: I have interviewed and examined the patient. I have reviewed the patient's H&P dated: 11/1/18. There are no significant changes.  H&P update referred to the surgeon.         Ready For Surgery From Anesthesia Perspective.        Consent: The patient's consent was obtained including but not limited to risks of crusting, scabbing, blistering, scarring, darker or lighter pigmentary change, recurrence, incomplete removal and infection. Render Note In Bullet Format When Appropriate: No Post-Care Instructions: I reviewed with the patient in detail post-care instructions. Patient is to wear sunprotection, and avoid picking at any of the treated lesions. Pt may apply Vaseline to crusted or scabbing areas. Detail Level: Zone Show Applicator Variable?: Yes Duration Of Freeze Thaw-Cycle (Seconds): 0 Medical Necessity Clause: This procedure was medically necessary because the lesions that were treated were: Medical Necessity Information: It is in your best interest to select a reason for this procedure from the list below. All of these items fulfill various CMS LCD requirements except the new and changing color options. Detail Level: Detailed

## 2021-11-15 ENCOUNTER — TELEPHONE (OUTPATIENT)
Dept: FAMILY MEDICINE | Facility: CLINIC | Age: 71
End: 2021-11-15
Payer: MEDICARE

## 2021-11-16 ENCOUNTER — OFFICE VISIT (OUTPATIENT)
Dept: FAMILY MEDICINE | Facility: CLINIC | Age: 71
End: 2021-11-16
Payer: MEDICARE

## 2021-11-16 VITALS
DIASTOLIC BLOOD PRESSURE: 70 MMHG | SYSTOLIC BLOOD PRESSURE: 128 MMHG | HEIGHT: 72 IN | RESPIRATION RATE: 20 BRPM | HEART RATE: 80 BPM | BODY MASS INDEX: 33.62 KG/M2 | WEIGHT: 248.25 LBS

## 2021-11-16 DIAGNOSIS — N45.2 ORCHIDITIS: ICD-10-CM

## 2021-11-16 DIAGNOSIS — I86.1 BILATERAL VARICOCELES: ICD-10-CM

## 2021-11-16 DIAGNOSIS — N43.3 HYDROCELE, BILATERAL: Primary | ICD-10-CM

## 2021-11-16 PROCEDURE — 96372 THER/PROPH/DIAG INJ SC/IM: CPT | Mod: PBBFAC

## 2021-11-16 PROCEDURE — 99999 PR STA SHADOW: ICD-10-PCS | Mod: PBBFAC,,, | Performed by: FAMILY MEDICINE

## 2021-11-16 PROCEDURE — 99999 PR STA SHADOW: CPT | Mod: PBBFAC,,,

## 2021-11-16 PROCEDURE — 99999 PR STA SHADOW: CPT | Mod: PBBFAC,,, | Performed by: FAMILY MEDICINE

## 2021-11-16 PROCEDURE — 99999 PR PBB SHADOW E&M-EST. PATIENT-LVL III: CPT | Mod: PBBFAC,,, | Performed by: FAMILY MEDICINE

## 2021-11-16 PROCEDURE — 99214 OFFICE O/P EST MOD 30 MIN: CPT | Mod: S$PBB | Performed by: FAMILY MEDICINE

## 2021-11-16 PROCEDURE — 99999 PR STA SHADOW: ICD-10-PCS | Mod: PBBFAC,,,

## 2021-11-16 PROCEDURE — 99213 OFFICE O/P EST LOW 20 MIN: CPT | Mod: PBBFAC,25 | Performed by: FAMILY MEDICINE

## 2021-11-16 RX ORDER — RABEPRAZOLE SODIUM 20 MG/1
20 TABLET, DELAYED RELEASE ORAL DAILY
Qty: 90 TABLET | Refills: 3 | Status: SHIPPED | OUTPATIENT
Start: 2021-11-16 | End: 2022-11-10

## 2021-11-16 RX ORDER — TADALAFIL 5 MG/1
TABLET ORAL
Qty: 30 TABLET | Refills: 11 | Status: SHIPPED | OUTPATIENT
Start: 2021-11-16

## 2021-11-16 RX ORDER — FINASTERIDE 5 MG/1
TABLET, FILM COATED ORAL
Qty: 90 TABLET | Refills: 3 | Status: SHIPPED | OUTPATIENT
Start: 2021-11-16 | End: 2022-11-10

## 2021-11-16 RX ORDER — CEFTRIAXONE 500 MG/1
500 INJECTION, POWDER, FOR SOLUTION INTRAMUSCULAR; INTRAVENOUS
Status: COMPLETED | OUTPATIENT
Start: 2021-11-16 | End: 2021-11-16

## 2021-11-16 RX ORDER — CEPHALEXIN 500 MG/1
500 CAPSULE ORAL EVERY 6 HOURS
Qty: 40 CAPSULE | Refills: 0 | Status: SHIPPED | OUTPATIENT
Start: 2021-11-16 | End: 2021-11-26

## 2021-11-16 RX ORDER — KETOROLAC TROMETHAMINE 30 MG/ML
30 INJECTION, SOLUTION INTRAMUSCULAR; INTRAVENOUS
Status: COMPLETED | OUTPATIENT
Start: 2021-11-16 | End: 2021-11-16

## 2021-11-16 RX ADMIN — CEFTRIAXONE SODIUM 500 MG: 1 INJECTION, POWDER, FOR SOLUTION INTRAMUSCULAR; INTRAVENOUS at 09:11

## 2021-11-16 RX ADMIN — KETOROLAC TROMETHAMINE 30 MG: 60 INJECTION, SOLUTION INTRAMUSCULAR at 09:11

## 2021-11-18 ENCOUNTER — OFFICE VISIT (OUTPATIENT)
Dept: FAMILY MEDICINE | Facility: CLINIC | Age: 71
End: 2021-11-18
Payer: MEDICARE

## 2021-11-18 ENCOUNTER — TELEPHONE (OUTPATIENT)
Dept: FAMILY MEDICINE | Facility: CLINIC | Age: 71
End: 2021-11-18
Payer: MEDICARE

## 2021-11-18 VITALS
TEMPERATURE: 99 F | DIASTOLIC BLOOD PRESSURE: 70 MMHG | RESPIRATION RATE: 16 BRPM | HEART RATE: 88 BPM | BODY MASS INDEX: 33.7 KG/M2 | SYSTOLIC BLOOD PRESSURE: 108 MMHG | WEIGHT: 248.81 LBS | HEIGHT: 72 IN

## 2021-11-18 DIAGNOSIS — I86.1 BILATERAL VARICOCELES: Primary | ICD-10-CM

## 2021-11-18 DIAGNOSIS — N43.3 HYDROCELE, BILATERAL: ICD-10-CM

## 2021-11-18 DIAGNOSIS — N43.3 HYDROCELE, UNSPECIFIED HYDROCELE TYPE: ICD-10-CM

## 2021-11-18 PROCEDURE — 99999 PR STA SHADOW: CPT | Mod: PBBFAC,,, | Performed by: FAMILY MEDICINE

## 2021-11-18 PROCEDURE — 99999 PR PBB SHADOW E&M-EST. PATIENT-LVL IV: CPT | Mod: PBBFAC,,, | Performed by: FAMILY MEDICINE

## 2021-11-18 PROCEDURE — 99214 OFFICE O/P EST MOD 30 MIN: CPT | Mod: PBBFAC | Performed by: FAMILY MEDICINE

## 2021-11-18 PROCEDURE — 99999 PR PBB SHADOW E&M-EST. PATIENT-LVL IV: ICD-10-PCS | Mod: PBBFAC,,, | Performed by: FAMILY MEDICINE

## 2021-11-18 PROCEDURE — 99213 OFFICE O/P EST LOW 20 MIN: CPT | Mod: S$PBB | Performed by: FAMILY MEDICINE

## 2021-11-19 ENCOUNTER — TELEPHONE (OUTPATIENT)
Dept: FAMILY MEDICINE | Facility: CLINIC | Age: 71
End: 2021-11-19
Payer: MEDICARE

## 2021-11-22 PROBLEM — N50.89 TESTICULAR MASS: Status: ACTIVE | Noted: 2021-11-22

## 2021-11-30 ENCOUNTER — OFFICE VISIT (OUTPATIENT)
Dept: FAMILY MEDICINE | Facility: CLINIC | Age: 71
End: 2021-11-30
Payer: MEDICARE

## 2021-11-30 VITALS
SYSTOLIC BLOOD PRESSURE: 112 MMHG | WEIGHT: 244.06 LBS | DIASTOLIC BLOOD PRESSURE: 86 MMHG | HEIGHT: 72 IN | BODY MASS INDEX: 33.06 KG/M2 | HEART RATE: 97 BPM | RESPIRATION RATE: 17 BRPM

## 2021-11-30 DIAGNOSIS — N45.2 ORCHIDITIS: Primary | ICD-10-CM

## 2021-11-30 PROCEDURE — 99999 PR PBB SHADOW E&M-EST. PATIENT-LVL III: CPT | Mod: PBBFAC,,, | Performed by: FAMILY MEDICINE

## 2021-11-30 PROCEDURE — 99999 PR PBB SHADOW E&M-EST. PATIENT-LVL III: ICD-10-PCS | Mod: PBBFAC,,, | Performed by: FAMILY MEDICINE

## 2021-11-30 PROCEDURE — 99213 OFFICE O/P EST LOW 20 MIN: CPT | Mod: S$PBB | Performed by: FAMILY MEDICINE

## 2021-11-30 PROCEDURE — 99213 OFFICE O/P EST LOW 20 MIN: CPT | Mod: PBBFAC | Performed by: FAMILY MEDICINE

## 2021-11-30 PROCEDURE — 99999 PR STA SHADOW: CPT | Mod: PBBFAC,,, | Performed by: FAMILY MEDICINE

## 2022-03-10 ENCOUNTER — TELEPHONE (OUTPATIENT)
Dept: SURGERY | Facility: CLINIC | Age: 72
End: 2022-03-10
Payer: MEDICARE

## 2022-03-16 DIAGNOSIS — Z12.11 ENCOUNTER FOR SCREENING COLONOSCOPY: Primary | ICD-10-CM

## 2022-07-25 NOTE — PROGRESS NOTES
Rheumatology Clinic Visit      Dyaday Buck MRN# [id number]   YOB: 1989 Age: 32 year old      Date of visit: 7/25/22   PCP: Lamin Zuniga    Chief Complaint   Patient presents with:  Ankylosing spondylitis    Assessment and Plan     1. Ankylosing Spondylitis: Diagnosed in 2014 by Dr. Tammi Murrieta, rheumatologist in [place], [place].  8/22/2014 [facility] MRI of the right hip showed sacroiliitis.  He has been on Humira since 2014 with resolution of his initial symptoms that included pain and stiffness of his bilateral hips, lower back, and neck.  He then had issues with Humira compliance, usually because he was not reordering his medication but since he has had his wife help with reordering he has been much better taking medication as prescribed.  Doing well from a clinical perspective at this time.  He would like to have repeat imaging done to reassess the intermittent nonradiating low back pain that is more degenerative in nature than inflammatory and this is reasonable.  Check x-ray of the SI joints and MRI of the pelvis.  Note that we had a discussion today about serial monitoring with imaging for inflammatory disorder and is not recommended at this time for that indication.  Chronic illness, stable.     - Continue Humira 40mg SQ every 14 days   - Naproxen 220mg BID PRN; purchases OTC  - X-ray: SI joints  - Noncontrast MRI of the pelvis; phone number provided so that he may call to schedule  - Labs in 6 months: CBC, Creatinine, Hepatic Panel, ESR, CRP, QuantiFERON-TB gold plus    2. Intermittent chronic nonradiating low back pain: See #1    3.  Vaccinations: Vaccinations reviewed with Mr. Buck.  Risks and benefits of vaccinations were discussed.    - Influenza: Encouraged yearly vaccination  - [id number]: up to date  - [id number]: up to date  - Shingrix: Advised receiving; patient is going to check on insurance coverage before determining if it is going to be received  - COVID-19:  Pt did not have psa done. Will cancel todays appt and send pt to lab. Dr ochoa will call results    Will phone review PSA and call results the patient   Pfizer on 4/26/2021, 5/17/2021, 1/24/2022. A 4th dose (1st booster) of the mRNA COVID-19 vaccination is recommended to be received 3 months after the third mRNA COVID-19 vaccination was received.  A 5th mRNA vaccine (2nd booster) may be received at least 4 months after the 4th dose.   Based on our current understanding (and this may change over time as we learn more), medications should be adjusted as noted below, if disease activity allows:  - NSAIDs and Acetaminophen: hold for 24 hours prior to vaccination if able to do so     Total minutes spent in evaluation with patient, documentation, , and review of pertinent studies and chart notes: 22     Mr. Buck verbalized agreement with and understanding of the rational for the diagnosis and treatment plan.  All questions were answered to best of my ability and the patient's satisfaction. Mr. Buck was advised to contact the clinic with any questions that may arise after the clinic visit.      Thank you for involving me in the care of the patient    Return to clinic: 6 months      HPI   Dayday Buck is a 32 year old male with a past medical history significant for ankylosing spondylitis who is seen for f/u of ankylosing spondylitis.    10/7/2016 rheumatology clinic note by Dr. Tammi Murrieta in [place], [place], documents HLA-B27 positive, sacroiliitis on MRI.  On Humira, naproxen PRN. Ankylosing Spondylitis.      2014 right hip MRI showed sacroiliitis    4/9/2019: Mr. Buck reported that he was doing great on Humira but he lost his job and insurance in December 2018 so has been off Humira since then.  He did well for about 2 months but then on month 2-4 he has noticed an increased use of naproxen.  He now uses naproxen 220-440 mg twice daily that is helpful for his lower back pain and stiffness.  Morning stiffness for about 1 hour that improves with activity and time and NSAIDs.  He would like to restart Humira.      10/8/2019: no showed to scheduled clinic  visit    1/29/2020: doing well when taking humira every 2 weeks, but often late by 1-2 weeks and symptoms return when late.  Mostly back issues when more symptomatic, and morning stiffness for about 1 hour. Says that he will be better about taking Humira with the help from phone reminders and his wife.     7/29/2020: doing better with taking Humira consistently; every 14 days.  Using naproxen 220mg BID PRN, about 2 days per week. More symptomatic for about 1 day prior to his dose of Humira, but very mildly increased achiness he says. Generally the achiness is just related to exercise.  He also notes having seasonal allergies, nasal congestion, and a postnasal drip but no cough.  He wonders what he should do for his allergies.    1/26/2021: dental work (crown) in Nov 2020 with pain since then and using naproxen for the tooth pain; hasn't followed up with his dentist.  AS is doing well without pain or stiffness.  Tolerating naproxen and Humira well. No GERD symptoms. No blood in urine or stool.     7/26/2021: Doing well at this time.  No joint pain or morning stiffness.  70% of the time he is taking Humira every 2 weeks, but occasionally he may take it on week #3 or week #4.  When he delays Humira sometimes he feels more achy and stiff.  As long as he takes Humira every 2 weeks and he has no joint pain or stiffness.  No joint swelling.  Has had some sinus congestion and is taking Claritin and Flonase at the direction of his PCP.    1/24/2022: baby born in August 2021 and when leaning back while holding the baby, bouncing and rocking the baby he feels like that aggravates his back. Also notices that arching his back in a short shower aggrevates his back.  Gained weight.  Has compressed his job into 7AM-1PM so that he can take care of his child in the morning.  His wife is stepping away from her job and that is a financial burden.  Lifting weights was routine prior to marriage, restarted weight lifting, but finding it  hard to stay motivated to exercise. Then minor cold last week.  Humira compliance has been better but not perfect. Using naproxen 220mg 0-1x/day.  Feels like overall his back pain and stiffness is worse in the morning and improves with time and activity.  Would like to try taking Humira every 7 days to see if he would have more benefit, but he does not want to try different medication if this frequency Humira is denied by his insurance and would rather than change back to Humira every 14 days.  His arthritis is not limiting any of his daily activities.    Today, 7/25/2022: Inflammatory Tritus is currently doing well.  Tolerating Humira that he says he takes every 14 days without missed doses now.  Morning stiffness for about 5-10 minutes.  Sometimes with low back pain that is worse with activity and improves with rest; pain does not radiate down the legs.  He notes that a friend he has is having serial monitoring with x-rays due to ankylosing spondylitis and he would like to have repeat imaging of his SI joints where he has the intermittent pain.    Denies fevers, chills, nausea, vomiting, constipation, diarrhea. No abdominal pain. No chest pain/pressure, palpitations, or shortness of breath. No LE swelling. No neck pain. No oral or nasal sores.  No sicca symptoms. No photosensitivity or photophobia.     Tobacco: none  EtOH: 1 drink per week  Drugs: none  Occupation: Was a  at the [employer]; now working at an online [employer] company    ROS   12 point review of system was completed and negative except as noted in the HPI     Active Problem List     Patient Active Problem List   Diagnosis     AS (ankylosing spondylitis) (H)     Acne vulgaris     Vegan diet     Past Medical History     Past Medical History:   Diagnosis Date     Acne      AS (ankylosing spondylitis) (H)      Dyspepsia      Past Surgical History     Past Surgical History:   Procedure Laterality Date     HEAD & NECK SURGERY      wisdom tooth  "extraction     ORTHOPEDIC SURGERY      3 arthroscopic knee surgery for ACL repair     Allergy     No Known Allergies  Current Medication List     Current Outpatient Medications   Medication Sig     adalimumab (HUMIRA *CF*) 40 MG/0.4ML prefilled syringe kit Inject 0.4 mLs (40 mg) Subcutaneous every 14 days . Hold for signs of infection, then seek medical attention.     azelastine (ASTELIN) 0.1 % nasal spray Spray 2 sprays into both nostrils 2 times daily     naproxen sodium (ANAPROX) 220 MG tablet Take 1 tablet (220 mg) by mouth 2 times daily as needed for moderate pain     oxymetazoline (AFRIN) 0.05 % nasal spray Spray 2 sprays into both nostrils 2 times daily     fluticasone (FLONASE) 50 MCG/ACT nasal spray Spray 2 sprays into both nostrils 2 times daily (Patient not taking: Reported on 11/2/2021)     No current facility-administered medications for this visit.     Social History   See HPI    Family History     Family History   Problem Relation Age of Onset     Family History Negative Mother      Cancer Father         skin cancer basal cell     Family History Negative Brother      Family History Negative Sister      Family History Negative Sister      Breast Cancer Maternal Aunt      No FHx of rheumatologic disease    Physical Exam     Temp Readings from Last 3 Encounters:   11/24/20 97.3  F (36.3  C) (Tympanic)   04/09/19 98.7  F (37.1  C) (Oral)   04/01/19 98.5  F (36.9  C) (Oral)     BP Readings from Last 5 Encounters:   01/24/22 127/79   11/02/21 (!) 142/92   07/26/21 134/84   11/24/20 120/76   01/29/20 137/75     Pulse Readings from Last 1 Encounters:   01/24/22 70     Resp Readings from Last 1 Encounters:   04/09/19 16     Estimated body mass index is 30.74 kg/m  as calculated from the following:    Height as of 1/24/22: 1.727 m (5' 8\").    Weight as of 1/24/22: 91.7 kg (202 lb 3.2 oz).    GEN: NAD.   HEENT:  Anicteric, noninjected sclera. No obvious external lesions of the ear and nose. Hearing intact.  CV: " S1, S2.  RRR.  No murmurs or rubs.  PULM: No increased work of breathing.   MSK: MCPs, PIPs, DIPs without swelling or tenderness to palpation.  Wrists without swelling or tenderness to palpation.  Elbows and shoulders without swelling or tenderness to palpation.  Knees, ankles, and MTPs without swelling or tenderness to palpation.  Achilles tendons nontender to palpation.  Able to touch toes from a standing position without bending knees.    SKIN: No rash or jaundice seen.  No nail pitting.  PSYCH: Alert. Appropriate.     Labs / Imaging (select studies)     CBC  Recent Labs   Lab Test 01/24/22  0914 07/26/21  1132 07/24/20  0826 01/29/20  1038 04/09/19  1328   WBC 6.9 5.9 5.5 6.6 7.2   RBC 5.04 4.70 4.71 4.65 4.89   HGB 15.3 14.5 14.8 14.1 15.1   HCT 43.6 40.7 40.7 40.0 41.9   MCV 87 87 86 86 86   RDW 11.5 11.8 11.9 11.9 11.7    231 218 235 258   MCH 30.4 30.9 31.4 30.3 30.9   MCHC 35.1 35.6 36.4 35.3 36.0   NEUTROPHIL 48 45 40.9 46.0 53.7   LYMPH 32 36 34.4 38.1 32.0   MONOCYTE 14 11 10.7 10.0 10.0   EOSINOPHIL 6 7 13.5 5.3 4.0   BASOPHIL 0 0 0.5 0.6 0.3   ANEU  --   --  2.3 3.0 3.9   ALYM  --   --  1.9 2.5 2.3   EPI  --   --  0.6 0.7 0.7   AEOS  --   --  0.7 0.4 0.3   ABAS  --   --  0.0 0.0 0.0   ANEUTAUTO 3.4 2.7  --   --   --    ALYMPAUTO 2.2 2.1  --   --   --    AMONOAUTO 1.0 0.7  --   --   --    AEOSAUTO 0.4 0.4  --   --   --    ABSBASO 0.0 0.0  --   --   --      CMP  Recent Labs   Lab Test 01/24/22  0914 07/26/21  1132 11/24/20  1417 07/24/20  0826 01/29/20  1038 04/09/19  1328 10/10/17  1359 03/24/17  0953   NA  --   --   --   --   --   --   --  139   POTASSIUM  --   --   --   --   --   --   --  4.1   CHLORIDE  --   --   --   --   --   --   --  104   CO2  --   --   --   --   --   --   --  29   ANIONGAP  --   --   --   --   --   --   --  6   *  --  69*  --   --   --   --  80   BUN  --   --   --   --   --   --   --  13   CR 0.93 0.78  --  0.93 0.84 1.04   < > 0.82   GFRESTIMATED >90 >90  --  >90  >90 >90   < > >90  Non  GFR Calc     GFRESTBLACK  --   --   --  >90 >90 >90   < > >90  African American GFR Calc     UZMA  --   --   --   --   --   --   --  8.7   BILITOTAL 0.5 0.6  --  0.9 0.4 0.6   < > 0.5   ALBUMIN 4.3 4.4  --  4.4 4.5 4.6   < > 4.3   PROTTOTAL 7.6 7.4  --  8.1 7.8 8.1   < > 7.6   ALKPHOS 67 55  --  63 54 64   < > 71   AST 25 25  --  29 22 23   < > 24   ALT 38 31  --  35 24 31   < > 29    < > = values in this interval not displayed.     Calcium/VitaminD  Recent Labs   Lab Test 03/24/17  0953   UZMA 8.7     ESR/CRP  Recent Labs   Lab Test 01/24/22  0914 07/26/21  1132 07/24/20  0826   SED 4 5 5   CRP <2.9 <2.9 <2.9     Hepatitis B  Recent Labs   Lab Test 07/26/21  1131   HBCAB Nonreactive   HEPBANG Nonreactive     Hepatitis C  Recent Labs   Lab Test 07/26/21  1131   HCVAB Nonreactive     Tuberculosis Screening  Recent Labs   Lab Test 07/26/21  1133 01/29/20  1038 10/10/17  1359   TBRES Negative Negative  --    TBRSLT  --   --  Negative   TBAGN  --   --  0.00     Immunization History     Immunization History   Administered Date(s) Administered     COVID-19,PF,Pfizer (12+ Yrs) 04/26/2021, 05/17/2021     COVID-19,PF,Pfizer 12+ Yrs (2022 and After) 01/24/2022     Influenza Vaccine IM > 6 months Valent IIV4 (Alfuria,Fluzone) 10/10/2017, 01/29/2020, 11/24/2020, 01/24/2022     Pneumo Conj 13-V (2010&after) 01/21/2016     Pneumococcal 23 valent 10/10/2017     Tdap (Adacel,Boostrix) 04/27/2016          Chart documentation done in part with Dragon Voice recognition Software. Although reviewed after completion, some word and grammatical error may remain.    Bobby Middleton MD

## 2022-09-21 ENCOUNTER — PATIENT OUTREACH (OUTPATIENT)
Dept: ADMINISTRATIVE | Facility: HOSPITAL | Age: 72
End: 2022-09-21
Payer: MEDICARE

## 2022-09-21 NOTE — PROGRESS NOTES
[facility] CMS chart audits-PCP VISIT/ANNUAL WELLNESS VISIT. Chart review completed for HM test overdue (mammograms, Colonoscopies, pap smears, DM labs, and/or EYE EXAMs)      Care Everywhere and media, updates requested and reviewed.      RE:  Patient needs PCP Visit-2022    Attempted to contact the patient.  Number is not taking calls at this time.

## 2022-11-10 DIAGNOSIS — N45.2 ORCHIDITIS: ICD-10-CM

## 2022-11-10 DIAGNOSIS — I86.1 BILATERAL VARICOCELES: ICD-10-CM

## 2022-11-10 RX ORDER — FINASTERIDE 5 MG/1
TABLET, FILM COATED ORAL
Qty: 90 TABLET | Refills: 3 | Status: SHIPPED | OUTPATIENT
Start: 2022-11-10 | End: 2023-11-13

## 2022-11-10 RX ORDER — RABEPRAZOLE SODIUM 20 MG/1
TABLET, DELAYED RELEASE ORAL
Qty: 90 TABLET | Refills: 3 | Status: SHIPPED | OUTPATIENT
Start: 2022-11-10 | End: 2023-11-13

## 2022-11-10 NOTE — TELEPHONE ENCOUNTER
Refill Routing Note   Medication(s) are not appropriate for processing by [facility] for the following reason(s):      - Prostate Cancer is on problem list  - Drug-Disease Interaction (RABEprazole and Liver mass)    [facility] action(s):  Defer Medication-related problems identified:   Requires appointment  Drug-disease interaction        Medication reconciliation completed: No     Appointments  past 12m or future 3m with PCP    Date Provider   Last Visit   11/30/2021 Dustin Pete MD   Next Visit   Visit date not found Dustin Pete MD   ED visits in past 90 days: 0        Note composed:11:44 AM 11/10/2022

## 2022-11-10 NOTE — TELEPHONE ENCOUNTER
Care Due:                  Date            Visit Type   Department     Provider  --------------------------------------------------------------------------------                                [facility] FAMILY    Dustin García  Last Visit: 11-      CARE ([facility])   MEDICINE       Jua nR  Next Visit: None Scheduled  None         None Found                                                            Last  Test          Frequency    Reason                     Performed    Due Date  --------------------------------------------------------------------------------    Office Visit  12 months..  RABEprazole, finasteride,   11- 11-                             tadalafiL................    Health [facility] Embedded Care Gaps. Reference number: [id number]. 11/10/2022   8:02:24 AM CST

## 2023-03-24 ENCOUNTER — PES CALL (OUTPATIENT)
Dept: ADMINISTRATIVE | Facility: CLINIC | Age: 73
End: 2023-03-24
Payer: MEDICARE

## 2023-11-12 DIAGNOSIS — N45.2 ORCHIDITIS: ICD-10-CM

## 2023-11-12 DIAGNOSIS — I86.1 BILATERAL VARICOCELES: ICD-10-CM

## 2023-11-12 NOTE — TELEPHONE ENCOUNTER
Care Due:                  Date            Visit Type   Department     Provider  --------------------------------------------------------------------------------                                [facility] FAMILY    Dustin García  Last Visit: 11-      CARE ([facility])   MEDICINE       Juan R  Next Visit: None Scheduled  None         None Found                                                            Last  Test          Frequency    Reason                     Performed    Due Date  --------------------------------------------------------------------------------    Office Visit  15 months..  RABEprazole, finasteride.  11- 02-    Health system Embedded Care Due Messages. Reference number: [id number].   11/12/2023 8:04:51 AM CST

## 2023-11-13 RX ORDER — RABEPRAZOLE SODIUM 20 MG/1
TABLET, DELAYED RELEASE ORAL
Qty: 90 TABLET | Refills: 3 | Status: SHIPPED | OUTPATIENT
Start: 2023-11-13

## 2023-11-13 RX ORDER — FINASTERIDE 5 MG/1
TABLET, FILM COATED ORAL
Qty: 90 TABLET | Refills: 3 | Status: SHIPPED | OUTPATIENT
Start: 2023-11-13

## 2023-11-13 NOTE — TELEPHONE ENCOUNTER
LOV 11/30/2021    Patient requesting refill for finasteride (PROSCAR) 5 mg tablet     RABEprazole (ACIPHEX) 20 mg tablet     Requested Prescriptions     Pending Prescriptions Disp Refills    finasteride (PROSCAR) 5 mg tablet [Pharmacy Med Name: finasteride 5 mg tablet] 90 tablet 3     Sig: TAKE ONE TABLET BY MOUTH ONCE A DAY IN THE MORNING FOR PROSTATE    RABEprazole (ACIPHEX) 20 mg tablet [Pharmacy Med Name: rabeprazole 20 mg tablet,delayed release] 90 tablet 3     Sig: TAKE ONE TABLET BY MOUTH ONCE A DAY       Medication pended/pharmacy confirmed, please advise.

## 2024-08-16 ENCOUNTER — OFFICE VISIT (OUTPATIENT)
Dept: FAMILY MEDICINE | Facility: CLINIC | Age: 74
End: 2024-08-16
Payer: MEDICARE

## 2024-08-16 VITALS
OXYGEN SATURATION: 96 % | DIASTOLIC BLOOD PRESSURE: 82 MMHG | WEIGHT: 247.94 LBS | RESPIRATION RATE: 16 BRPM | SYSTOLIC BLOOD PRESSURE: 116 MMHG | HEART RATE: 79 BPM | HEIGHT: 72 IN | BODY MASS INDEX: 33.58 KG/M2

## 2024-08-16 DIAGNOSIS — Z01.818 PRE-OPERATIVE CLEARANCE: ICD-10-CM

## 2024-08-16 DIAGNOSIS — H25.013 CORTICAL AGE-RELATED CATARACT OF BOTH EYES: Primary | ICD-10-CM

## 2024-08-16 PROCEDURE — 99999 PR PBB SHADOW E&M-EST. PATIENT-LVL III: CPT | Mod: PBBFAC,,, | Performed by: FAMILY MEDICINE

## 2024-08-16 PROCEDURE — 99213 OFFICE O/P EST LOW 20 MIN: CPT | Mod: PBBFAC | Performed by: FAMILY MEDICINE

## 2024-08-16 NOTE — PROGRESS NOTES
Subjective:       Patient ID: Awilda Rosenthal is a 73 y.o. male.    Chief Complaint: Pre-op Exam (Patient is here for a pre op exam for eye surgery that is scheduled for 08/29/2024.)    74 y/o W M for pre-op for cataract removal      Review of Systems   Constitutional:  Negative for appetite change.   HENT:  Negative for congestion, ear pain, sneezing and sore throat.    Eyes:  Negative for redness and visual disturbance.   Respiratory:  Negative for cough, chest tightness and stridor.    Cardiovascular:  Negative for chest pain.   Gastrointestinal:  Negative for abdominal pain, blood in stool, diarrhea, nausea and vomiting.   Genitourinary:  Negative for difficulty urinating, dysuria and hematuria.   Musculoskeletal:  Negative for arthralgias, back pain, joint swelling, myalgias and neck pain.   Skin:  Negative for rash.   Neurological:  Negative for dizziness.   Psychiatric/Behavioral:  Negative for agitation. The patient is not nervous/anxious.        Objective:      Physical Exam  Constitutional:       Appearance: He is well-developed.   HENT:      Head: Normocephalic.   Eyes:      Pupils: Pupils are equal, round, and reactive to light.   Neck:      Thyroid: No thyromegaly.   Cardiovascular:      Rate and Rhythm: Normal rate and regular rhythm.      Heart sounds: No murmur heard.     No friction rub.   Pulmonary:      Effort: Pulmonary effort is normal. No respiratory distress.      Breath sounds: No wheezing.   Abdominal:      Tenderness: There is no abdominal tenderness. There is no guarding or rebound.   Musculoskeletal:         General: No tenderness. Normal range of motion.      Cervical back: Normal range of motion and neck supple.   Lymphadenopathy:      Cervical: No cervical adenopathy.   Skin:     General: Skin is warm and dry.   Neurological:      Mental Status: He is alert and oriented to person, place, and time.      Cranial Nerves: No cranial nerve deficit.      Deep Tendon Reflexes: Reflexes are  normal and symmetric.   Psychiatric:         Thought Content: Thought content normal.         Judgment: Judgment normal.         Assessment:       Encounter Diagnoses   Name Primary?    Cortical age-related cataract of both eyes Yes    Pre-operative clearance          Plan:   1. Cortical age-related cataract of both eyes    2. Pre-operative clearance

## 2024-09-21 NOTE — DISCHARGE SUMMARY
Discharge Note      SUMMARY     Admit Date: 1/9/2020    Attending Physician: Rylan Razo MD     Discharge Physician: Rylan Razo MD    Discharge Date: 1/9/2020     Final Diagnosis: Post-Op Diagnosis Codes:     * Anal fissure [K60.2]    Hospital Course: Patient was admitted for an outpatient procedure and tolerated the procedure well with no complications.    Disposition: Home or Self Care    Follow Up/Patient Instructions:   High fiber diet/daily fiber supplement  8-10 glasses of water/day  Colace 100 mg 2x/day  Miralax 1 capful in glass of water 2x/day if needed for cosntipation  Avoid straining/constipation  Gauze packing will pass with 1st BM  Dry dressing as needed  Some bleeding is expected - call for excessive bleeding  Call for severe pain, fever >101, difficulty urinating, constipation  Follow-up with Dr. Razo in 3 weeks        Current Discharge Medication List      START taking these medications    Details   oxyCODONE-acetaminophen (PERCOCET) 5-325 mg per tablet Take 1 tablet by mouth every 6 (six) hours as needed for Pain.  Qty: 25 tablet, Refills: 0         CONTINUE these medications which have NOT CHANGED    Details   aspirin (ECOTRIN) 81 MG EC tablet Take 81 mg by mouth once daily.      docusate sodium (COLACE) 100 MG capsule Take 1 capsule (100 mg total) by mouth 2 (two) times daily as needed for Constipation.  Qty: 30 capsule, Refills: 0      vitamin D 1000 units Tab Take 2,000 Units by mouth once daily.      clotrimazole-betamethasone 1-0.05% (LOTRISONE) cream Apply to head of  Penis BId  Qty: 45 g, Refills: 1      diltiazem HCl (DILTIAZEM 2% CREAM) Apply topically 3 (three) times daily. Apply topically to anal area.  Qty: 50 g, Refills: 5    Associated Diagnoses: Anal fissure      HYDROcodone-acetaminophen (NORCO) 7.5-325 mg per tablet Take one tablet by mouth once a day for severe anal pain; opioid medically exempt & necessary for greater then 7 days  Qty: 30 tablet, Refills: 0    Comments:  diagnosis anal fissure      !! hydrocortisone 2.5 % cream Apply topically 2 (two) times daily.  Qty: 1 Tube, Refills: 0      !! hydrocortisone 2.5 % cream Apply topically 2 (two) times daily.  Qty: 28 g, Refills: 2      !! hydrocortisone 2.5 % cream Apply topically to affected area(s) 2 (two) times daily.  Qty: 28 g, Refills: 5      sulfamethoxazole-trimethoprim 800-160mg (BACTRIM DS) 800-160 mg Tab TAKE ONE TABLET BY MOUTH EVERY TWELVE HOURS STARTING THE DAY BEFORE prostate biopsy FOR 4 doses ONLY  Refills: 0      tadalafil (CIALIS) 20 MG Tab Take 1 tablet (20 mg total) by mouth daily as needed.  Qty: 8 tablet, Refills: 5    Associated Diagnoses: Male erectile disorder       !! - Potential duplicate medications found. Please discuss with provider.          Discharge Procedure Orders (must include Diet, Follow-up, Activity)   Discharge Procedure Orders (must include Diet, Follow-up, Activity)   Diet Adult Regular     Activity as tolerated         21-Sep-2024 17:16

## 2024-11-27 DIAGNOSIS — I86.1 BILATERAL VARICOCELES: ICD-10-CM

## 2024-11-27 DIAGNOSIS — N45.2 ORCHIDITIS: ICD-10-CM

## 2024-11-27 NOTE — TELEPHONE ENCOUNTER
Refill Routing Note   Medication(s) are not appropriate for processing by [facility] for the following reason(s):        Drug-disease interaction    [facility] action(s):  Defer      Medication Therapy Plan: PROSTATE CANCER      Appointments  past 12m or future 3m with PCP    Date Provider   Last Visit   8/16/2024 Dustin Pete MD   Next Visit   Visit date not found Dustin Pete MD   ED visits in past 90 days: 0        Note composed:8:18 AM 11/27/2024

## 2024-11-27 NOTE — TELEPHONE ENCOUNTER
No care due was identified.  Health [facility] Embedded Care Due Messages. Reference number: [id number].   11/27/2024 8:01:59 AM CST

## 2024-11-29 DIAGNOSIS — I86.1 BILATERAL VARICOCELES: ICD-10-CM

## 2024-11-29 DIAGNOSIS — N45.2 ORCHIDITIS: ICD-10-CM

## 2024-11-29 RX ORDER — FINASTERIDE 5 MG/1
TABLET, FILM COATED ORAL
Qty: 90 TABLET | Refills: 0 | Status: SHIPPED | OUTPATIENT
Start: 2024-11-29

## 2024-11-29 RX ORDER — FINASTERIDE 5 MG/1
TABLET, FILM COATED ORAL
Qty: 90 TABLET | Refills: 2 | OUTPATIENT
Start: 2024-11-29

## 2024-12-04 NOTE — TELEPHONE ENCOUNTER
No care due was identified.  Health [facility] Embedded Care Due Messages. Reference number: [id number].   12/04/2024 3:11:46 PM CST

## 2024-12-05 RX ORDER — RABEPRAZOLE SODIUM 20 MG/1
TABLET, DELAYED RELEASE ORAL
Qty: 90 TABLET | Refills: 2 | Status: SHIPPED | OUTPATIENT
Start: 2024-12-05

## 2024-12-05 NOTE — TELEPHONE ENCOUNTER
Refill Decision Note   Awilda Ariadna  is requesting a refill authorization.  Brief Assessment and Rationale for Refill:  Approve     Medication Therapy Plan:         Comments:     Note composed:2:50 AM 12/05/2024

## 2025-02-21 DIAGNOSIS — Z00.00 ENCOUNTER FOR MEDICARE ANNUAL WELLNESS EXAM: ICD-10-CM

## 2025-03-12 ENCOUNTER — TELEPHONE (OUTPATIENT)
Dept: INTERNAL MEDICINE | Facility: CLINIC | Age: 75
End: 2025-03-12
Payer: MEDICARE

## 2025-03-12 NOTE — TELEPHONE ENCOUNTER
----- Message from Alejandro sent at 3/11/2025 11:01 AM CDT -----  Contact: pt  Awilda IRENA RosenthalMRN: [id number]: 1950PCP: Dustin Pete.Home Phone      [phone] Phone      Not on file.Mobile          [phone]: Pt came into clinic wanting to speak with nurse about needing a new handicap tag for his car. Pt stated it  today.Please [phone]

## 2025-03-25 DIAGNOSIS — C61 PROSTATE CANCER: ICD-10-CM

## 2025-03-25 DIAGNOSIS — Z12.5 SCREENING FOR PROSTATE CANCER: Primary | ICD-10-CM

## 2025-03-27 DIAGNOSIS — N45.2 ORCHIDITIS: ICD-10-CM

## 2025-03-27 DIAGNOSIS — I86.1 BILATERAL VARICOCELES: ICD-10-CM

## 2025-03-27 RX ORDER — FINASTERIDE 5 MG/1
TABLET, FILM COATED ORAL
Qty: 30 TABLET | Refills: 0 | Status: SHIPPED | OUTPATIENT
Start: 2025-03-27

## 2025-03-27 NOTE — TELEPHONE ENCOUNTER
Spoke with pt and let him know that I requested a one month supply rx until he could come in for his appt 4/3/25.  Pt voiced understanding.

## 2025-03-27 NOTE — TELEPHONE ENCOUNTER
----- Message from Alejandro sent at 3/27/2025  8:47 AM CDT -----  Contact: pt  Awilda IRENA RosenthalMRN: [id number]: 1950PCP: Dustin Pete.Home Phone      [phone] Phone      Not on file.Mobile          [phone]: Pt came into clinic wanting to speak with nurse about why medication finasteride (PROSCAR) 5 mg tablet was denied. Please [phone]

## 2025-04-03 ENCOUNTER — LAB VISIT (OUTPATIENT)
Dept: LAB | Facility: HOSPITAL | Age: 75
End: 2025-04-03
Attending: FAMILY MEDICINE
Payer: MEDICARE

## 2025-04-03 ENCOUNTER — OFFICE VISIT (OUTPATIENT)
Dept: INTERNAL MEDICINE | Facility: CLINIC | Age: 75
End: 2025-04-03
Payer: MEDICARE

## 2025-04-03 ENCOUNTER — RESULTS FOLLOW-UP (OUTPATIENT)
Dept: INTERNAL MEDICINE | Facility: CLINIC | Age: 75
End: 2025-04-03

## 2025-04-03 ENCOUNTER — TELEPHONE (OUTPATIENT)
Dept: INTERNAL MEDICINE | Facility: CLINIC | Age: 75
End: 2025-04-03

## 2025-04-03 VITALS
SYSTOLIC BLOOD PRESSURE: 128 MMHG | RESPIRATION RATE: 18 BRPM | WEIGHT: 251.31 LBS | DIASTOLIC BLOOD PRESSURE: 82 MMHG | OXYGEN SATURATION: 98 % | HEART RATE: 75 BPM | BODY MASS INDEX: 34.04 KG/M2 | HEIGHT: 72 IN

## 2025-04-03 DIAGNOSIS — R97.20 ELEVATED PSA: ICD-10-CM

## 2025-04-03 DIAGNOSIS — N47.1 PHIMOSIS OF PENIS: ICD-10-CM

## 2025-04-03 DIAGNOSIS — E78.5 HYPERLIPIDEMIA, UNSPECIFIED HYPERLIPIDEMIA TYPE: ICD-10-CM

## 2025-04-03 DIAGNOSIS — E66.811 CLASS 1 OBESITY WITH BODY MASS INDEX (BMI) OF 34.0 TO 34.9 IN ADULT, UNSPECIFIED OBESITY TYPE, UNSPECIFIED WHETHER SERIOUS COMORBIDITY PRESENT: Primary | ICD-10-CM

## 2025-04-03 DIAGNOSIS — K29.70 GASTRITIS, PRESENCE OF BLEEDING UNSPECIFIED, UNSPECIFIED CHRONICITY, UNSPECIFIED GASTRITIS TYPE: ICD-10-CM

## 2025-04-03 DIAGNOSIS — E03.9 HYPOTHYROIDISM, UNSPECIFIED TYPE: ICD-10-CM

## 2025-04-03 DIAGNOSIS — C61 PROSTATE CANCER: ICD-10-CM

## 2025-04-03 LAB
ABSOLUTE EOSINOPHIL (OHS): 0.16 K/UL
ABSOLUTE MONOCYTE (OHS): 0.4 K/UL (ref 0.3–1)
ABSOLUTE NEUTROPHIL COUNT (OHS): 3.12 K/UL (ref 1.8–7.7)
ALBUMIN SERPL BCP-MCNC: 3.7 G/DL (ref 3.5–5.2)
ALP SERPL-CCNC: 89 UNIT/L (ref 40–150)
ALT SERPL W/O P-5'-P-CCNC: 11 UNIT/L (ref 10–44)
ANION GAP (OHS): 6 MMOL/L (ref 8–16)
AST SERPL-CCNC: 13 UNIT/L (ref 11–45)
BASOPHILS # BLD AUTO: 0.06 K/UL
BASOPHILS NFR BLD AUTO: 1.2 %
BILIRUB SERPL-MCNC: 0.7 MG/DL (ref 0.1–1)
BUN SERPL-MCNC: 16 MG/DL (ref 8–23)
CALCIUM SERPL-MCNC: 8.8 MG/DL (ref 8.7–10.5)
CHLORIDE SERPL-SCNC: 109 MMOL/L (ref 95–110)
CHOLEST SERPL-MCNC: 211 MG/DL (ref 120–199)
CHOLEST/HDLC SERPL: 5.1 {RATIO} (ref 2–5)
CO2 SERPL-SCNC: 25 MMOL/L (ref 23–29)
CREAT SERPL-MCNC: 1.1 MG/DL (ref 0.5–1.4)
ERYTHROCYTE [DISTWIDTH] IN BLOOD BY AUTOMATED COUNT: 13.9 % (ref 11.5–14.5)
GFR SERPLBLD CREATININE-BSD FMLA CKD-EPI: >60 ML/MIN/1.73/M2
GLUCOSE SERPL-MCNC: 96 MG/DL (ref 70–110)
HCT VFR BLD AUTO: 46.6 % (ref 40–54)
HDLC SERPL-MCNC: 41 MG/DL (ref 40–75)
HDLC SERPL: 19.4 % (ref 20–50)
HGB BLD-MCNC: 15.3 GM/DL (ref 14–18)
IMM GRANULOCYTES # BLD AUTO: 0.01 K/UL (ref 0–0.04)
IMM GRANULOCYTES NFR BLD AUTO: 0.2 % (ref 0–0.5)
LDLC SERPL CALC-MCNC: 139.4 MG/DL (ref 63–159)
LYMPHOCYTES # BLD AUTO: 1.1 K/UL (ref 1–4.8)
MCH RBC QN AUTO: 29.8 PG (ref 27–31)
MCHC RBC AUTO-ENTMCNC: 32.8 G/DL (ref 32–36)
MCV RBC AUTO: 91 FL (ref 82–98)
NONHDLC SERPL-MCNC: 170 MG/DL
NUCLEATED RBC (/100WBC) (OHS): 0 /100 WBC
PLATELET # BLD AUTO: 204 K/UL (ref 150–450)
PMV BLD AUTO: 9.7 FL (ref 9.2–12.9)
POTASSIUM SERPL-SCNC: 4.5 MMOL/L (ref 3.5–5.1)
PROT SERPL-MCNC: 7.2 GM/DL (ref 6–8.4)
PSA SERPL-MCNC: 2.26 NG/ML
RBC # BLD AUTO: 5.14 M/UL (ref 4.6–6.2)
RELATIVE EOSINOPHIL (OHS): 3.3 %
RELATIVE LYMPHOCYTE (OHS): 22.7 % (ref 18–48)
RELATIVE MONOCYTE (OHS): 8.2 % (ref 4–15)
RELATIVE NEUTROPHIL (OHS): 64.4 % (ref 38–73)
SODIUM SERPL-SCNC: 140 MMOL/L (ref 136–145)
T4 FREE SERPL-MCNC: 0.83 NG/DL (ref 0.71–1.51)
TRIGL SERPL-MCNC: 153 MG/DL (ref 30–150)
TSH SERPL-ACNC: 2.13 UIU/ML (ref 0.4–4)
WBC # BLD AUTO: 4.85 K/UL (ref 3.9–12.7)

## 2025-04-03 PROCEDURE — 99999 PR PBB SHADOW E&M-EST. PATIENT-LVL III: CPT | Mod: PBBFAC,,, | Performed by: FAMILY MEDICINE

## 2025-04-03 PROCEDURE — 85025 COMPLETE CBC W/AUTO DIFF WBC: CPT

## 2025-04-03 PROCEDURE — 82435 ASSAY OF BLOOD CHLORIDE: CPT

## 2025-04-03 PROCEDURE — 84439 ASSAY OF FREE THYROXINE: CPT

## 2025-04-03 PROCEDURE — 99213 OFFICE O/P EST LOW 20 MIN: CPT | Mod: PBBFAC | Performed by: FAMILY MEDICINE

## 2025-04-03 PROCEDURE — 99999 PR STA SHADOW: CPT | Mod: PBBFAC,,, | Performed by: FAMILY MEDICINE

## 2025-04-03 PROCEDURE — 36415 COLL VENOUS BLD VENIPUNCTURE: CPT

## 2025-04-03 PROCEDURE — 80061 LIPID PANEL: CPT

## 2025-04-03 PROCEDURE — 84153 ASSAY OF PSA TOTAL: CPT

## 2025-04-03 PROCEDURE — 99214 OFFICE O/P EST MOD 30 MIN: CPT | Mod: S$PBB | Performed by: FAMILY MEDICINE

## 2025-04-03 PROCEDURE — 84439 ASSAY OF FREE THYROXINE: CPT | Mod: 59

## 2025-04-03 RX ORDER — CLOTRIMAZOLE AND BETAMETHASONE DIPROPIONATE 10; .64 MG/G; MG/G
CREAM TOPICAL
Qty: 45 G | Refills: 5 | Status: SHIPPED | OUTPATIENT
Start: 2025-04-03

## 2025-04-03 RX ORDER — RABEPRAZOLE SODIUM 20 MG/1
20 TABLET, DELAYED RELEASE ORAL DAILY
Qty: 90 TABLET | Refills: 3 | Status: SHIPPED | OUTPATIENT
Start: 2025-04-03

## 2025-04-03 NOTE — TELEPHONE ENCOUNTER
Called and spoke with pt and let them know that most insurances, including Medicare, will not pay for Ozempic unless you have DMT2.  Pt said that he wanted to wait until his lab results came back to see if he was Diabetic and we would decide form there if we should try Wegovy.

## 2025-04-03 NOTE — PROGRESS NOTES
Subjective:       Patient ID: Awilda Rosenthal is a 74 y.o. male.    Chief Complaint: Follow-up (Pt is here for ck up)    73 y/o W M for medical wellness    Follow-up  Pertinent negatives include no abdominal pain, arthralgias, chest pain, congestion, coughing, joint swelling, myalgias, nausea, neck pain, rash, sore throat or vomiting.     Review of Systems   Constitutional:  Negative for appetite change.   HENT:  Negative for congestion, ear pain, sneezing and sore throat.    Eyes:  Negative for redness and visual disturbance.   Respiratory:  Negative for cough, chest tightness and stridor.    Cardiovascular:  Negative for chest pain.   Gastrointestinal:  Negative for abdominal pain, blood in stool, diarrhea, nausea and vomiting.   Genitourinary:  Negative for difficulty urinating, dysuria and hematuria.        Nocturia x2   Musculoskeletal:  Negative for arthralgias, back pain, joint swelling, myalgias and neck pain.   Skin:  Negative for rash.   Neurological:  Negative for dizziness.   Psychiatric/Behavioral:  Negative for agitation. The patient is not nervous/anxious.        Objective:      Physical Exam  Constitutional:       Appearance: He is well-developed. He is obese.   HENT:      Head: Normocephalic.   Eyes:      Pupils: Pupils are equal, round, and reactive to light.   Neck:      Thyroid: No thyromegaly.   Cardiovascular:      Rate and Rhythm: Normal rate and regular rhythm.      Heart sounds: No murmur heard.     No friction rub.   Pulmonary:      Effort: Pulmonary effort is normal. No respiratory distress.      Breath sounds: No wheezing.   Abdominal:      Tenderness: There is no abdominal tenderness. There is no guarding or rebound.   Musculoskeletal:         General: No tenderness. Normal range of motion.      Cervical back: Normal range of motion and neck supple.   Lymphadenopathy:      Cervical: No cervical adenopathy.   Skin:     General: Skin is warm and dry.   Neurological:      Mental Status: He  is alert and oriented to person, place, and time.      Cranial Nerves: No cranial nerve deficit.      Deep Tendon Reflexes: Reflexes are normal and symmetric.   Psychiatric:         Thought Content: Thought content normal.         Judgment: Judgment normal.         Assessment:       Encounter Diagnoses   Name Primary?    Class 1 obesity with body mass index (BMI) of 34.0 to 34.9 in adult, unspecified obesity type, unspecified whether serious comorbidity present Yes    Phimosis of penis     Hyperlipidemia, unspecified hyperlipidemia type     Hypothyroidism, unspecified type     Gastritis, presence of bleeding unspecified, unspecified chronicity, unspecified gastritis type     Elevated PSA          Plan:   1. Class 1 obesity with body mass index (BMI) of 34.0 to 34.9 in adult, unspecified obesity type, unspecified whether serious comorbidity present  -     semaglutide (OZEMPIC) 0.25 mg or 0.5 mg(2 mg/1.5 mL) pen injector; Inject 0.25 mg into the skin every 7 days.  Dispense: 4 each; Refill: 5    2. Phimosis of penis  -     clotrimazole-betamethasone 1-0.05% (LOTRISONE) cream; Apply to penis daily  Dispense: 45 g; Refill: 5    3. Hyperlipidemia, unspecified hyperlipidemia type  -     CBC Auto Differential; Future; Expected date: 04/03/2025  -     Comprehensive Metabolic Panel; Future; Expected date: 04/03/2025  -     Lipid Panel; Future; Expected date: 04/03/2025    4. Hypothyroidism, unspecified type  -     TSH; Future; Expected date: 04/03/2025  -     T4, Free; Future; Expected date: 04/03/2025    5. Gastritis, presence of bleeding unspecified, unspecified chronicity, unspecified gastritis type  -     RABEprazole (ACIPHEX) 20 mg tablet; Take 1 tablet (20 mg total) by mouth once daily.  Dispense: 90 tablet; Refill: 3    6. Elevated PSA  -     Prostate Specific Antigen, Diagnostic; Future; Expected date: 04/03/2025

## 2025-04-03 NOTE — TELEPHONE ENCOUNTER
----- Message from Gianna sent at 4/3/2025 12:54 PM CDT -----  Contact: pt  Awilda A AriadnaN: [id number]: 1950PCP: Dustin Pete.Home Phone      [phone] Phone      Not on file.Mobile          [phone]: pt stopped by to let us know the ozempic was denied at [facility]. The message didn't say if we needed a PA or if we needed to appeal it. Pt just wanted to let us know what was going [phone]

## 2025-04-24 DIAGNOSIS — N45.2 ORCHIDITIS: ICD-10-CM

## 2025-04-24 DIAGNOSIS — I86.1 BILATERAL VARICOCELES: ICD-10-CM

## 2025-04-24 RX ORDER — FINASTERIDE 5 MG/1
TABLET, FILM COATED ORAL
Qty: 30 TABLET | Refills: 5 | Status: SHIPPED | OUTPATIENT
Start: 2025-04-24

## 2025-04-24 RX ORDER — FINASTERIDE 5 MG/1
TABLET, FILM COATED ORAL
Qty: 30 TABLET | Refills: 0 | OUTPATIENT
Start: 2025-04-24

## 2025-04-24 NOTE — TELEPHONE ENCOUNTER
No care due was identified.  [facility] Embedded Care Due Messages. Reference number: [id number].   4/24/2025 8:52:05 AM CDT

## 2025-04-24 NOTE — TELEPHONE ENCOUNTER
Awilda Rosenthal  MRN: [id number]  : 1950  PCP: Dustin Pete.  Home Phone [phone]   Work Phone Not on file.   Mobile [phone]       MESSAGE:   Pt requesting refill or new Rx.   Is this a refill or new RX:  refill   RX name and strength: finasteride (PROSCAR) 5 mg tablet   Last office visit: 4/3/2025  Is this a 30-day or 90-day RX:  30  Pharm[facility] name and location:  [facility]  Comments:      Phone:  [phone]

## 2025-07-10 ENCOUNTER — OFFICE VISIT (OUTPATIENT)
Dept: INTERNAL MEDICINE | Facility: CLINIC | Age: 75
End: 2025-07-10
Payer: MEDICARE

## 2025-07-10 VITALS
DIASTOLIC BLOOD PRESSURE: 68 MMHG | BODY MASS INDEX: 34.64 KG/M2 | SYSTOLIC BLOOD PRESSURE: 124 MMHG | OXYGEN SATURATION: 97 % | HEART RATE: 76 BPM | RESPIRATION RATE: 18 BRPM | WEIGHT: 255.75 LBS | HEIGHT: 72 IN

## 2025-07-10 DIAGNOSIS — N47.1 PHIMOSIS OF PENIS: Primary | ICD-10-CM

## 2025-07-10 DIAGNOSIS — E66.811 CLASS 1 OBESITY WITH BODY MASS INDEX (BMI) OF 34.0 TO 34.9 IN ADULT, UNSPECIFIED OBESITY TYPE, UNSPECIFIED WHETHER SERIOUS COMORBIDITY PRESENT: ICD-10-CM

## 2025-07-10 DIAGNOSIS — E55.9 VITAMIN D INSUFFICIENCY: ICD-10-CM

## 2025-07-10 DIAGNOSIS — E78.5 HYPERLIPIDEMIA, UNSPECIFIED HYPERLIPIDEMIA TYPE: ICD-10-CM

## 2025-07-10 PROCEDURE — 99213 OFFICE O/P EST LOW 20 MIN: CPT | Mod: S$PBB | Performed by: FAMILY MEDICINE

## 2025-07-10 PROCEDURE — 99999 PR STA SHADOW: CPT | Mod: PBBFAC,,,

## 2025-07-10 PROCEDURE — 99213 OFFICE O/P EST LOW 20 MIN: CPT | Mod: PBBFAC | Performed by: FAMILY MEDICINE

## 2025-07-10 PROCEDURE — 99999 PR PBB SHADOW E&M-EST. PATIENT-LVL III: CPT | Mod: PBBFAC,,, | Performed by: FAMILY MEDICINE

## 2025-07-10 RX ORDER — CLOTRIMAZOLE AND BETAMETHASONE DIPROPIONATE 10; .64 MG/G; MG/G
CREAM TOPICAL
Qty: 45 G | Refills: 5 | Status: SHIPPED | OUTPATIENT
Start: 2025-07-10

## 2025-07-10 RX ORDER — SEMAGLUTIDE 0.25 MG/.5ML
0.28 INJECTION, SOLUTION SUBCUTANEOUS WEEKLY
Qty: 2.32 ML | Refills: 3 | Status: SHIPPED | OUTPATIENT
Start: 2025-07-10

## 2025-07-10 NOTE — PROGRESS NOTES
Subjective:       Patient ID: Awilda Rosenthal is a 74 y.o. male.    Chief Complaint: Follow-up (3 mo f/u )    73 y/o W M for medical wellness    Follow-up  Pertinent negatives include no abdominal pain, arthralgias, chest pain, congestion, coughing, joint swelling, myalgias, nausea, neck pain, rash, sore throat or vomiting.     Review of Systems   Constitutional:  Negative for appetite change.   HENT:  Negative for congestion, ear pain, sneezing and sore throat.    Eyes:  Negative for redness and visual disturbance.   Respiratory:  Negative for cough, chest tightness and stridor.    Cardiovascular:  Negative for chest pain.   Gastrointestinal:  Negative for abdominal pain, blood in stool, diarrhea, nausea and vomiting.   Genitourinary:  Negative for difficulty urinating, dysuria and hematuria.   Musculoskeletal:  Negative for arthralgias, back pain, joint swelling, myalgias and neck pain.   Skin:  Negative for rash.   Neurological:  Negative for dizziness.   Psychiatric/Behavioral:  Negative for agitation. The patient is not nervous/anxious.        Objective:      Physical Exam  Constitutional:       Appearance: He is obese.         Assessment:       Encounter Diagnoses   Name Primary?    Class 1 obesity with body mass index (BMI) of 34.0 to 34.9 in adult, unspecified obesity type, unspecified whether serious comorbidity present     Phimosis of penis Yes    Hyperlipidemia, unspecified hyperlipidemia type          Plan:   1. Phimosis of penis  -     clotrimazole-betamethasone 1-0.05% (LOTRISONE) cream; Apply to penis daily  Dispense: 45 g; Refill: 5    2. Class 1 obesity with body mass index (BMI) of 34.0 to 34.9 in adult, unspecified obesity type, unspecified whether serious comorbidity present  -     semaglutide, weight loss, (WEGOVY) 0.25 mg/0.5 mL PnIj; Inject 0.29 mg into the skin once a week.  Dispense: 2.32 mL; Refill: 3    3. Hyperlipidemia, unspecified hyperlipidemia type

## (undated) DEVICE — NDL SAFETY 25G X 1.5 ECLIPSE

## (undated) DEVICE — PACK GENERAL SURGERY

## (undated) DEVICE — SPONGE DERMACEA GAUZE 4X4

## (undated) DEVICE — SPONGE SURGIFOAM 100 8.5X12X10

## (undated) DEVICE — GLOVE 8 PROTEXIS PI BLUE

## (undated) DEVICE — NDL ECLIPSE SAFETY 18GX1-1/2IN

## (undated) DEVICE — SYR 30CC LUER LOCK

## (undated) DEVICE — DRAPE MINOR PROCEDURE

## (undated) DEVICE — GUN BIOPSY 18GA MONOPLY

## (undated) DEVICE — SCALPEL #11 BLADE STRL DISP

## (undated) DEVICE — JELLY LUBRICANT STERILE 4 OZ

## (undated) DEVICE — SUT CHROMIC 3-0 SH 27IN GUT